# Patient Record
Sex: FEMALE | Race: WHITE | NOT HISPANIC OR LATINO | Employment: FULL TIME | URBAN - NONMETROPOLITAN AREA
[De-identification: names, ages, dates, MRNs, and addresses within clinical notes are randomized per-mention and may not be internally consistent; named-entity substitution may affect disease eponyms.]

---

## 2023-04-19 ENCOUNTER — OFFICE VISIT (OUTPATIENT)
Dept: PRIMARY CARE | Facility: CLINIC | Age: 53
End: 2023-04-19
Payer: COMMERCIAL

## 2023-04-19 VITALS
HEART RATE: 58 BPM | OXYGEN SATURATION: 96 % | RESPIRATION RATE: 12 BRPM | DIASTOLIC BLOOD PRESSURE: 81 MMHG | HEIGHT: 66 IN | TEMPERATURE: 97.8 F | SYSTOLIC BLOOD PRESSURE: 121 MMHG | BODY MASS INDEX: 25.65 KG/M2 | WEIGHT: 159.6 LBS

## 2023-04-19 DIAGNOSIS — R61 HYPERHIDROSIS: ICD-10-CM

## 2023-04-19 DIAGNOSIS — E78.2 MIXED HYPERLIPIDEMIA: ICD-10-CM

## 2023-04-19 DIAGNOSIS — E03.9 ACQUIRED HYPOTHYROIDISM: Primary | ICD-10-CM

## 2023-04-19 DIAGNOSIS — E55.9 VITAMIN D DEFICIENCY: ICD-10-CM

## 2023-04-19 DIAGNOSIS — B00.1 RECURRENT COLD SORES: ICD-10-CM

## 2023-04-19 PROCEDURE — 99214 OFFICE O/P EST MOD 30 MIN: CPT | Performed by: FAMILY MEDICINE

## 2023-04-19 PROCEDURE — 1036F TOBACCO NON-USER: CPT | Performed by: FAMILY MEDICINE

## 2023-04-19 RX ORDER — GLUCOSAM/CHONDRO/HERB 149/HYAL 750-100 MG
TABLET ORAL
COMMUNITY

## 2023-04-19 RX ORDER — CHOLECALCIFEROL (VITAMIN D3) 50 MCG
TABLET ORAL
COMMUNITY

## 2023-04-19 RX ORDER — ALUMINUM CHLORIDE 20 %
SOLUTION, NON-ORAL TOPICAL NIGHTLY
Qty: 60 ML | Refills: 2 | Status: SHIPPED | OUTPATIENT
Start: 2023-04-19 | End: 2024-04-18

## 2023-04-19 RX ORDER — ACYCLOVIR 50 MG/G
1 OINTMENT TOPICAL
Qty: 15 G | Refills: 1 | Status: SHIPPED | OUTPATIENT
Start: 2023-04-19

## 2023-04-19 RX ORDER — FERROUS SULFATE 325(65) MG
65 TABLET ORAL DAILY
COMMUNITY

## 2023-04-19 RX ORDER — LEVOTHYROXINE SODIUM 75 UG/1
TABLET ORAL
COMMUNITY
End: 2024-06-04 | Stop reason: SDUPTHER

## 2023-04-19 RX ORDER — MULTIVIT-MIN/IRON/FOLIC ACID/K 18-600-40
CAPSULE ORAL
COMMUNITY

## 2023-04-19 NOTE — PROGRESS NOTES
"Subjective     Patient ID: Helen Gómez is a 52 y.o. female who presents for follow up of chronic medical conditions.    Tdap- 03/30/2016  Mammogram- 10/20/2022  Colonoscopy- 04/02/2019  Pap: 12/07/2022    Doing good, no concerns at this time   Needs a script  for the cold sores and the medication for the sweating- would like paper scripts.     HPI  Patient states that seasonal allergies have been well controlled with OTC allergy medication.    Patient requests prescription cream for cold sores.    Patient notes increased sweating recently.     Review of Systems  constitutional: as per HPI  eyes:as per HPI  CV:as per HPI  Respiratory:as per HPI  GI:as per HPI  neuro:as per HPI  endo:as per HPI  heme/lymph:as per HPI     Objective   /81 (BP Location: Left arm, Patient Position: Sitting, BP Cuff Size: Adult)   Pulse 58   Temp 36.6 °C (97.8 °F) (Temporal)   Resp 12   Ht 1.664 m (5' 5.5\")   Wt 72.4 kg (159 lb 9.6 oz)   LMP  (LMP Unknown)   SpO2 96%   BMI 26.15 kg/m²   Physical Exam  Constitutional:       Appearance: Normal appearance. She is overweight.   Cardiovascular:      Rate and Rhythm: Normal rate and regular rhythm.   Pulmonary:      Effort: Pulmonary effort is normal.      Breath sounds: Normal breath sounds.         Assessment/Plan   Problem List Items Addressed This Visit          Endocrine/Metabolic    Acquired hypothyroidism - Primary     Stable.  Continue Synthroid 75 mcg daily.  TSH has been stable, so plan is to wait until October for blood labs to recheck yearly.         Vitamin D deficiency     Stable.  Continue vitamin D supplement daily.            Infectious/Inflammatory    Recurrent cold sores     Sent prescription for antiviral cream to use as needed.         Relevant Medications    acyclovir (Zovirax) 5 % ointment       Other    Mixed hyperlipidemia     Stable.  Continue fish oil.         Hyperhidrosis     Sent prescription for Drysol.         Relevant Medications    aluminum " chloride (Drysol) 20 % external solution     Follow up in 6 months.     By signing my name below I, mati Shipman, attest that this documentation has been prepared under the direction and in the presence of Dr. Vick Henry. 04/19/23

## 2023-04-19 NOTE — ASSESSMENT & PLAN NOTE
Stable.  Continue Synthroid 75 mcg daily.  TSH has been stable, so plan is to wait until October for blood labs to recheck yearly.

## 2023-09-02 ENCOUNTER — TELEPHONE (OUTPATIENT)
Dept: PRIMARY CARE | Facility: CLINIC | Age: 53
End: 2023-09-02
Payer: COMMERCIAL

## 2023-09-02 DIAGNOSIS — J01.90 ACUTE NON-RECURRENT SINUSITIS, UNSPECIFIED LOCATION: Primary | ICD-10-CM

## 2023-09-02 RX ORDER — AMOXICILLIN AND CLAVULANATE POTASSIUM 875; 125 MG/1; MG/1
875 TABLET, FILM COATED ORAL 2 TIMES DAILY
Qty: 20 TABLET | Refills: 0 | Status: SHIPPED | OUTPATIENT
Start: 2023-09-02 | End: 2023-09-18 | Stop reason: ALTCHOICE

## 2023-09-18 ENCOUNTER — OFFICE VISIT (OUTPATIENT)
Dept: PRIMARY CARE | Facility: CLINIC | Age: 53
End: 2023-09-18
Payer: COMMERCIAL

## 2023-09-18 VITALS
TEMPERATURE: 97.4 F | SYSTOLIC BLOOD PRESSURE: 133 MMHG | RESPIRATION RATE: 16 BRPM | DIASTOLIC BLOOD PRESSURE: 74 MMHG | OXYGEN SATURATION: 94 % | HEART RATE: 59 BPM

## 2023-09-18 DIAGNOSIS — J06.9 VIRAL URI WITH COUGH: Primary | ICD-10-CM

## 2023-09-18 PROBLEM — E03.9 HYPOTHYROIDISM: Status: ACTIVE | Noted: 2023-09-18

## 2023-09-18 PROBLEM — D22.70 MELANOCYTIC NEVI OF UNSPECIFIED LOWER LIMB, INCLUDING HIP: Status: ACTIVE | Noted: 2018-06-20

## 2023-09-18 PROBLEM — J30.9 ALLERGIC RHINITIS: Status: ACTIVE | Noted: 2023-09-18

## 2023-09-18 PROBLEM — S01.501D UNSPECIFIED OPEN WOUND OF LIP, SUBSEQUENT ENCOUNTER: Status: ACTIVE | Noted: 2018-06-20

## 2023-09-18 PROBLEM — E03.2 HYPOTHYROIDISM DUE TO DRUGS: Status: ACTIVE | Noted: 2023-09-18

## 2023-09-18 PROBLEM — R92.30 DENSE BREASTS: Status: ACTIVE | Noted: 2023-09-18

## 2023-09-18 PROBLEM — G47.30 MILD SLEEP APNEA: Status: ACTIVE | Noted: 2023-09-18

## 2023-09-18 PROBLEM — Z85.828 PERSONAL HISTORY OF OTHER MALIGNANT NEOPLASM OF SKIN: Status: ACTIVE | Noted: 2018-06-20

## 2023-09-18 PROBLEM — D22.39 MELANOCYTIC NEVI OF OTHER PARTS OF FACE: Status: ACTIVE | Noted: 2018-06-20

## 2023-09-18 PROBLEM — L57.0 ACTINIC KERATOSIS: Status: ACTIVE | Noted: 2018-06-20

## 2023-09-18 PROBLEM — D22.5 MELANOCYTIC NEVI OF TRUNK: Status: ACTIVE | Noted: 2018-06-20

## 2023-09-18 PROBLEM — L57.9 SKIN CHANGES DUE TO CHRONIC EXPOSURE TO NONIONIZING RADIATION, UNSPECIFIED: Status: ACTIVE | Noted: 2018-06-20

## 2023-09-18 PROBLEM — C44.311 BASAL CELL CARCINOMA OF SKIN OF NOSE: Status: ACTIVE | Noted: 2018-06-20

## 2023-09-18 PROBLEM — L90.5 SCAR CONDITION AND FIBROSIS OF SKIN: Status: ACTIVE | Noted: 2018-06-20

## 2023-09-18 PROBLEM — N95.1 MENOPAUSAL SYMPTOMS: Status: ACTIVE | Noted: 2023-09-18

## 2023-09-18 PROBLEM — R06.83 SNORING: Status: ACTIVE | Noted: 2023-09-18

## 2023-09-18 PROBLEM — E78.5 HYPERLIPIDEMIA: Status: ACTIVE | Noted: 2023-09-18

## 2023-09-18 PROBLEM — L82.1 OTHER SEBORRHEIC KERATOSIS: Status: ACTIVE | Noted: 2018-06-20

## 2023-09-18 PROBLEM — R92.2 DENSE BREASTS: Status: ACTIVE | Noted: 2023-09-18

## 2023-09-18 PROBLEM — D22.60 MELANOCYTIC NEVI OF UNSPECIFIED UPPER LIMB, INCLUDING SHOULDER: Status: ACTIVE | Noted: 2018-06-20

## 2023-09-18 PROBLEM — D48.5 NEOPLASM OF UNCERTAIN BEHAVIOR OF SKIN: Status: ACTIVE | Noted: 2018-06-20

## 2023-09-18 PROBLEM — I78.1 NEVUS, NON-NEOPLASTIC: Status: ACTIVE | Noted: 2018-06-20

## 2023-09-18 PROCEDURE — 99213 OFFICE O/P EST LOW 20 MIN: CPT | Performed by: FAMILY MEDICINE

## 2023-09-18 PROCEDURE — 1036F TOBACCO NON-USER: CPT | Performed by: FAMILY MEDICINE

## 2023-09-18 PROCEDURE — 87635 SARS-COV-2 COVID-19 AMP PRB: CPT

## 2023-09-18 NOTE — PROGRESS NOTES
Subjective   Patient ID: Helen Gómez is a 52 y.o. female who presents for nasal drainage, Cough, and Sore Throat.    HPI   duration: 3 days   facial pain/pressure: Y  facial swelling:N  dental pain:N  nasal discharge:Y  congestion:Y  ear pain: N  fever: N  body aches:Y  chills:N  sore throat:Y  swollen glands: N  cough:Y  General fatigue: Y  Headache: N  Nasal itching: N  Nose bleeds:N  Sneezing:Y  Wheezing: N  Hives: N    treatment: ROBATUSSIN     Finished augmentin antibiotic on 9/12 for a sinus infection, prescribed by .   She returned from traveling a few days ago, [place]. She was on a cruise and flew.  Took a covid test initially and it was negative.     Review of Systems  See HPI    Objective   /74 (BP Location: Left arm, Patient Position: Sitting, BP Cuff Size: Adult)   Pulse 59   Temp 36.3 °C (97.4 °F) (Temporal)   Resp 16   LMP  (LMP Unknown)   SpO2 94%     Physical Exam  Constitutional: Well developed, well nourished, alert and in no acute distress.  Head and Face: NC/AT  Eyes: Normal external exam.   ENT: External inspection of ears normal, tympanic membranes visualized and normal. Nasal mucosa and turbinates swollen and erythematous, clear nasal discharge present. Oral mucosa moist, oropharynx clear without tonsillar exudate or erythema.   Neck: Supple. No cervical lymphadenopathy   Cardiovascular: Regular rate and rhythm, normal S1 and S2, no murmurs, gallops, or rubs.   Pulmonary: No respiratory distress, lungs clear to auscultation bilaterally. No wheezes, rhonchi, rales.  Skin: Warm, well perfused, normal skin turgor and color.   Neurologic: Cranial nerves II-XII grossly intact.      Assessment/Plan     Pending covid-19 test    You have been diagnosed with an upper respiratory tract infection (URI), which is an inflammation/infection of the upper respiratory tract /lungs.  These are almost viral in nature, and therefore treatment is management of symptoms. Treatment with an  antibiotic for typical URI does not help, and can cause harm from side effects of medications and bacterial resistance.     Drink at least 6-8 glasses of water daily to thin secretions.  Mucinex can be used if secretions remain thick.      A teaspoon of honey every 4 hours as needed for cough has been shown to reduce cough as well or better than over-the-counter cough suppressants.  Delsym or Robitussin are recommended to stop cough.  Cough drops can also be helpful.     For nasal congestion, please use Richard Med Sinus Rinse at least once daily to rinse out your sinuses. You can also try Flonase nasal spray over the counter - 1-2 sprays in each nostril daily. You can also consider Sudafed or Phenylephrine for nasal congestion but avoid if have hypertension and be aware can stimulate and cause problems sleeping.  Do not use for more than 5 days. Afrin decongestant nasal spray (oxymetazoline) can also be used for 2-3 days only.     For drainage problems, try Allegra, Claritin or Zyrtec.      For sore throat, try honey in tea, Chloraseptic, Cepacol throat lozenges, and salt water gargles.      Fever or aches can be helped by taking acetaminophen (Tylenol) every four hours as needed, or ibuprofen (Motrin, Advil) or naproxen (Aleve) as directed if you are able.   Maximum dosing of ibuprofen is 800 mg every 8 hours and maximum dose of tylenol is 1,000 mg every 8 hours - do not use for longer than 1 week unless directed by your doctor.       If you should develop a fever and worsening cough or nasal secretions with consistent yellow or green phlegm, please contact us.

## 2023-09-19 LAB — SARS-COV-2 RESULT: NOT DETECTED

## 2023-09-20 ENCOUNTER — TELEPHONE (OUTPATIENT)
Dept: PRIMARY CARE | Facility: CLINIC | Age: 53
End: 2023-09-20
Payer: COMMERCIAL

## 2023-09-20 NOTE — TELEPHONE ENCOUNTER
Patient was seen Monday for URI    She is still coughing up yellow phlegm, body aches, constant runny nose and is not improving at all    Is there something that can be called in/recommended for her to take?    She uses [email or web address] in [place]

## 2023-09-20 NOTE — TELEPHONE ENCOUNTER
Patient is now on day 6-7 of illness, this is likely still viral and should be improving in the next 1-2 days as viruses typically peak around this time which is why she has not improved yet.  She should continue supportive care at home at this time and drink plenty of fluids. If she develops fever and/or shortness of breath we can consider a CXR.

## 2023-10-10 ENCOUNTER — OFFICE VISIT (OUTPATIENT)
Dept: PRIMARY CARE | Facility: CLINIC | Age: 53
End: 2023-10-10
Payer: COMMERCIAL

## 2023-10-10 VITALS
DIASTOLIC BLOOD PRESSURE: 72 MMHG | BODY MASS INDEX: 25.63 KG/M2 | OXYGEN SATURATION: 95 % | HEART RATE: 60 BPM | SYSTOLIC BLOOD PRESSURE: 133 MMHG | RESPIRATION RATE: 16 BRPM | TEMPERATURE: 97.8 F | WEIGHT: 156.4 LBS

## 2023-10-10 DIAGNOSIS — H69.90 DYSFUNCTION OF EUSTACHIAN TUBE, UNSPECIFIED LATERALITY: ICD-10-CM

## 2023-10-10 DIAGNOSIS — R06.2 WHEEZING: ICD-10-CM

## 2023-10-10 DIAGNOSIS — J30.9 ALLERGIC RHINITIS, UNSPECIFIED SEASONALITY, UNSPECIFIED TRIGGER: Primary | ICD-10-CM

## 2023-10-10 DIAGNOSIS — D48.5 NEOPLASM OF UNCERTAIN BEHAVIOR OF SCALP: ICD-10-CM

## 2023-10-10 PROCEDURE — 99213 OFFICE O/P EST LOW 20 MIN: CPT | Performed by: FAMILY MEDICINE

## 2023-10-10 PROCEDURE — 1036F TOBACCO NON-USER: CPT | Performed by: FAMILY MEDICINE

## 2023-10-10 RX ORDER — PREDNISONE 10 MG/1
TABLET ORAL
Qty: 21 TABLET | Refills: 0 | Status: SHIPPED | OUTPATIENT
Start: 2023-10-10 | End: 2023-12-07 | Stop reason: WASHOUT

## 2023-10-10 RX ORDER — ALBUTEROL SULFATE 90 UG/1
2 AEROSOL, METERED RESPIRATORY (INHALATION) EVERY 4 HOURS PRN
Qty: 8.5 G | Refills: 0 | Status: SHIPPED | OUTPATIENT
Start: 2023-10-10 | End: 2023-12-07 | Stop reason: WASHOUT

## 2023-10-10 NOTE — PATIENT INSTRUCTIONS
Assessment/Plan   Diagnoses and all orders for this visit:  Allergic rhinitis, unspecified seasonality, unspecified trigger  -     predniSONE (Deltasone) 10 mg tablet; 4 tabs x 2 d  3 tabs x 2 d  2 tabs x 2 d  1 tab x 2 d  1/2 tab x 2 days  Dysfunction of Eustachian tube, unspecified laterality  -     predniSONE (Deltasone) 10 mg tablet; 4 tabs x 2 d  3 tabs x 2 d  2 tabs x 2 d  1 tab x 2 d  1/2 tab x 2 days  Wheezing  -     predniSONE (Deltasone) 10 mg tablet; 4 tabs x 2 d  3 tabs x 2 d  2 tabs x 2 d  1 tab x 2 d  1/2 tab x 2 days  -     albuterol (ProAir HFA) 90 mcg/actuation inhaler; Inhale 2 puffs every 4 hours if needed for wheezing or shortness of breath.  Neoplasm of uncertain behavior of scalp    #1 lesion to scalp - patient to reach out to her established dermatologist in [place] for further evaluation. Can look for more local dermatologist if she wishes. Can all or message for dermatology referral if needed.    #2 cough/ETD/allergies    Exam and symptoms consistent with post-infectious bronchospasm and inflammatory process. Given duration and bothersome symptoms would start on oral steroid.    Prednisone taper has been sent to your pharmacy.  Side effects can include insomnia, irritability, hunger, weight gain, flushing,  elevated blood sugars, suppressed immune system.     Albuterol inhaler has been sent to your pharmacy.  You may use one to two puffs every four hours as needed for wheeze, cough, or chest tightness, or for use 15 minutes prior to activity if exercise is triggering your symptoms. Ask your pharmacist for help with instructions for administration if you are not familiar with use of inhalers.     Continue with netipot once daily.  Continue with nasal spray.  Continue with oral antihistamine.    You may find it helpful to add in antiinflammatory foods to your diet such as ginger, turmeric, rosemary, and cinnamon.     Consider supplementing with turmeric capsules 500 mg - 1000 mg daily for  anti-inflammatory effects    Follow-up with Dr. Henry for routine care.  Call for sooner follow-up if needed.     Scribe Attestation  By signing my name below, I, Marcos Coronel   attest that this documentation has been prepared under the direction and in the presence of uKsum Ortiz DO.

## 2023-10-10 NOTE — PROGRESS NOTES
Subjective   Patient ID: Helen Gómez is a 52 y.o. female who presents for Sinus Problem (Pt presents with some sinus issues, residual dry cough, drainage- states that this has been going on for about 3 weeks ) and lump on head  (Pt presents with a lump on her head for a couple of weeks- states that it is about half the size that it was previously, no pain, no redness).    HPI       Patient of Vick Henry MD    Patient here for evaluation of lump on head and upper respiratory symptoms.    #1 Lump to scalp.  Reports noticed this couple of weeks, found by her .  She is unsure how long present but reports it has decreased to half the size it was previously.   No pain, no redness.  She is already established with dermatology in [place].    #2 Sinus issues  States started after coming back from cruise to [place].    Finished augmentin antibiotic on 9/12 for a sinus infection, prescribed by .     Saw Dr. Baldwin on 9/18/2023 and was advised to start oral antihistamine and netipot.    Reports residual dry cough and sinus drainage x 3-4 weeks.  Still having to clear throat but no colored sputum.  Nose is clear, no green or yellow sputum.  Taking Claritin at night.  Uses nasal spray daily.  Was taking Zyrtec in AM too but did not seem to help so she stopped.  Was using netipot twice daily but just recently stopped this.    States typically does not have allergy issues on regular basis.      Review of Systems   All other systems reviewed and are negative.    Objective   /72 (BP Location: Left arm, Patient Position: Sitting, BP Cuff Size: Small adult)   Pulse 60   Temp 36.6 °C (97.8 °F) (Temporal)   Resp 16   Wt 70.9 kg (156 lb 6.4 oz)   LMP  (LMP Unknown)   SpO2 95%   BMI 25.63 kg/m²     Physical Exam  Vitals and nursing note reviewed.   Constitutional:       General: She is not in acute distress.     Appearance: Normal appearance. She is not toxic-appearing.   HENT:      Head:  Normocephalic and atraumatic.      Right Ear: Tympanic membrane, ear canal and external ear normal.      Left Ear: Tympanic membrane, ear canal and external ear normal.      Nose:      Right Turbinates: Swollen (significant).      Left Turbinates: Swollen (significant).      Comments: Nasal turbinates edematous,    right greater than left/ possible polyp vs edematous inferior turbinate     Mouth/Throat:      Lips: Pink.      Mouth: Mucous membranes are moist.      Pharynx: Oropharynx is clear.   Eyes:      Extraocular Movements: Extraocular movements intact.      Pupils: Pupils are equal, round, and reactive to light.   Neck:      Thyroid: No thyromegaly.   Cardiovascular:      Rate and Rhythm: Normal rate and regular rhythm.      Heart sounds: No murmur heard.     No friction rub. No gallop.   Pulmonary:      Effort: Pulmonary effort is normal.      Breath sounds: Wheezing (with forced expiration) present. No rhonchi or rales.   Lymphadenopathy:      Cervical: No cervical adenopathy.   Skin:     Comments: Approximately 3 cm x 2 cm brown papule on right parietal scalp region, textured/poss verrucous.    Neurological:      Mental Status: She is alert and oriented to person, place, and time.   Psychiatric:         Mood and Affect: Mood normal.         Behavior: Behavior normal.         Assessment/Plan   Diagnoses and all orders for this visit:  Allergic rhinitis, unspecified seasonality, unspecified trigger  -     predniSONE (Deltasone) 10 mg tablet; 4 tabs x 2 d  3 tabs x 2 d  2 tabs x 2 d  1 tab x 2 d  1/2 tab x 2 days  Dysfunction of Eustachian tube, unspecified laterality  -     predniSONE (Deltasone) 10 mg tablet; 4 tabs x 2 d  3 tabs x 2 d  2 tabs x 2 d  1 tab x 2 d  1/2 tab x 2 days  Wheezing  -     predniSONE (Deltasone) 10 mg tablet; 4 tabs x 2 d  3 tabs x 2 d  2 tabs x 2 d  1 tab x 2 d  1/2 tab x 2 days  -     albuterol (ProAir HFA) 90 mcg/actuation inhaler; Inhale 2 puffs every 4 hours if needed for  wheezing or shortness of breath.  Neoplasm of uncertain behavior of scalp    #1 lesion to scalp - patient to reach out to her established dermatologist in [place] for further evaluation. Can look for more local dermatologist if she wishes. Can all or message for dermatology referral if needed.    #2 cough/ETD/allergies    Exam and symptoms consistent with post-infectious bronchospasm and inflammatory process. Given duration and bothersome symptoms would start on oral steroid.    Prednisone taper has been sent to your pharmacy.  Side effects can include insomnia, irritability, hunger, weight gain, flushing,  elevated blood sugars, suppressed immune system.     Albuterol inhaler has been sent to your pharmacy.  You may use one to two puffs every four hours as needed for wheeze, cough, or chest tightness, or for use 15 minutes prior to activity if exercise is triggering your symptoms. Ask your pharmacist for help with instructions for administration if you are not familiar with use of inhalers.     Continue with netipot once daily.  Continue with nasal spray.  Continue with oral antihistamine.    You may find it helpful to add in antiinflammatory foods to your diet such as ginger, turmeric, rosemary, and cinnamon.     NEXT STEP WOULD BE ENT FOR SINUS EVALUATION IF SYMPTOMS DO NOT IMPROVE OR IF THEY RECUR OFTEN    Follow-up with Dr. Henry for routine care.  Call for sooner follow-up if needed.     Scribe Attestation  By signing my name below, Leanne BRADSHAW Scribe   attest that this documentation has been prepared under the direction and in the presence of Kusum Ortiz DO.

## 2023-10-18 ENCOUNTER — APPOINTMENT (OUTPATIENT)
Dept: PRIMARY CARE | Facility: CLINIC | Age: 53
End: 2023-10-18
Payer: COMMERCIAL

## 2023-12-06 ENCOUNTER — APPOINTMENT (OUTPATIENT)
Dept: PRIMARY CARE | Facility: CLINIC | Age: 53
End: 2023-12-06
Payer: COMMERCIAL

## 2023-12-06 PROBLEM — Z00.00 HEALTHCARE MAINTENANCE: Status: ACTIVE | Noted: 2023-12-06

## 2023-12-06 NOTE — PROGRESS NOTES
Subjective   Patient ID: Helen Gómez is a 53 y.o. female who presents for Annual Exam (Pt presents for annual physical exam- Abn given to pt and signed, pt verbalized understanding. Pt states no other concerns at this time.).    HPI     Patient of Vick Henry MD    Patient presents today for annual physical.  Patient is doing well overall, they have no new concerns or issues.     States believes stressed, dog is currently at vet with .  Elevated BP today is outlier for patient.  She does have BP cuff at home but does not usually check.  Patient denies chest pain, shortness of breath with exertion, palpitations, lower extremity edema, headache, or dizziness.     She is taking vitamin D supplement, 5000 IU daily.  Drinks dairy in lattes, unsure if taking any calcium supplement.    WELLNESS VISIT   TDAP: none found  SHINGRIX: completed  PNEUMOVAX: N/A  PAP: 12/2022 (ASCUS negative, HPV negative) (repeat due 2027)  MAMMO: 10/2022  CSCOPE: 4/2019 (no polyps, diverticulosis, repeat in 10 years)  DEXA: N/A  HEP C SCREEN: none found  CACS: 0 in 10/2018 (age 48) (father with hx of CAD s/p CABG x 4)  LIPID: 10/2022 TC/HDL 2.8, HDL 81, , 74    Patient has already had her influenza vaccine.  Patient believes her TDAP vaccine is UTD.    Diet: balanced, eats a fairly healthy diet  Exercise: walks dogs frequently, horse back riding on weekends, no weight training  Alcohol use: rare, maybe 1 beverage per week  Smoking: never smoker    Cervical cancer screening: UTD  Denies family history in first degree relative.  Denies pelvic pain, vaginal discharge, or vaginal bleeding.    Breast cancer screening: DUE  Denies family history in first degree relative.  Denies lumps/bumps, skin changes, nipple retraction, or nipple drainage.    Colon cancer screening: UTD  Denies family history in first degree relative.  Denies melena, hematochezia, constipation, diarrhea, bloating, change in bowel habits.    ROUTINE  "VISIT  CHRONIC CONDITIONS:    -HYPOTHYROIDISM  Last TSH in 10/2022 was normal  Current dose: Synthroid 75 mcg x 5 days, 150 mcg x 2 days    Medication is being taken as directed and is well-tolerated.   Patient denies heat or cold intolerance, diarrhea or constipation, coarsening of hair, and palpitations.     -SLEEP APNEA  Mild per 3/2021 sleep study  Reports snoring but feels well-rested upon waking, no napping needed.  Some headaches but feels is weather related    Review of Systems   All other systems reviewed and are negative.      Objective   /83 (BP Location: Right arm, Patient Position: Sitting, BP Cuff Size: Small adult)   Pulse 54   Temp 36.6 °C (97.8 °F) (Temporal)   Resp 14   Ht 1.651 m (5' 5\")   Wt 69.5 kg (153 lb 4.8 oz)   LMP  (LMP Unknown)   SpO2 96%   BMI 25.51 kg/m²     Physical Exam  Vitals and nursing note reviewed.   Constitutional:       General: She is not in acute distress.     Appearance: Normal appearance. She is not toxic-appearing.   HENT:      Head: Normocephalic and atraumatic.   Eyes:      Extraocular Movements: Extraocular movements intact.      Pupils: Pupils are equal, round, and reactive to light.   Neck:      Thyroid: No thyromegaly.      Vascular: No hepatojugular reflux or JVD.   Cardiovascular:      Rate and Rhythm: Normal rate and regular rhythm.      Heart sounds: No murmur heard.     No friction rub. No gallop.   Pulmonary:      Effort: Pulmonary effort is normal.      Breath sounds: Normal breath sounds. No wheezing, rhonchi or rales.   Abdominal:      General: Bowel sounds are normal. There is no distension.      Palpations: Abdomen is soft. There is no mass.      Tenderness: There is no abdominal tenderness. There is no guarding.   Musculoskeletal:      Right lower leg: No edema.      Left lower leg: No edema.   Lymphadenopathy:      Cervical: No cervical adenopathy.   Neurological:      General: No focal deficit present.      Mental Status: She is alert and " oriented to person, place, and time.   Psychiatric:         Mood and Affect: Mood normal.         Behavior: Behavior normal.         Assessment/Plan   Problem List Items Addressed This Visit             ICD-10-CM    Acquired hypothyroidism E03.9     S/p RIOS to treat toxic nodule   Last TSH was normal in 10/2022, due for recheck, ordered today.  Continue present regimen at this time, will be notified if changes needed pending lab results.         Relevant Orders    TSH with reflex to Free T4 if abnormal    Mild sleep apnea G47.30     Very mild noted on sleep study from 2021  Some snoring (not disruptive) but feels well-rested.  Not using CPAP, plans to continue with conservative treatments.    Uncontrolled sleep apnea can lead to fatigue, daytime somnolence, weight gain, hypertension, and fluid retention. If becomes bothersome can reach out for next steps.         Healthcare maintenance - Primary Z00.00     TDAP: none found  SHINGRIX: completed  PNEUMOVAX: N/A  PAP: 12/2022 (ASCUS negative, HPV negative) (repeat due 2027)  MAMMO: 10/2022  CSCOPE: 4/2019 (no polyps, diverticulosis, repeat in 10 years)  DEXA: N/A  HEP C SCREEN: none found  CACS: 0 in 10/2018 (age 48)  LIPID: 10/2022 TC/HDL 2.8, HDL 81, , 74    Vaccines and screenings reviewed.  Questionnaires completed.  Health and wellness topics reviewed.  Diet and exercise recommendations revisited.  Routine blood work ordered today.     VACCINES:  -Flu is UTD for Fall 2023  -TDAP is UTD per patient report  -Shingrix vaccines previously completed, good for lifetime.  -Pneumonia vaccine recommended at age 65+.    SCREENINGS:  -Screening pap is UTD, normal in 2022, repeat due in 2027  -Screening mammogram is DUE, ordered today  -Screening colonoscopy is UTD, no polyps in 2019, repeat in 5-10 years.  -Screening DEXA recommended for ages 65+    LIFESTYLE MEASURES  -make sure you are avoiding refined carbs such as breads, pasta, cereal, candy, soda,  nutrition  bars, granola, chips, and sugar sweetened beverages.      -eat 5- 7 servings daily of veggies,  healthy protein such as chicken, fish,  beans, and eggs, and include healthy fats in your diet such as seeds, nuts, olive oil, avocados, and salmon.   -exercise 4 - 6 days per week as you are able, 150 minutes total weekly divided up is recommended.  -Vitamin D is recommended at 1000 - 5000 IU international units daily.   -Always wear sunscreen when you have sun exposure.  -64 oz of water is recommended daily.  -Dental visits recommended every 6 months.  -Eye exam recommended every 2 years, for those with vision problems every year.           Relevant Orders    CBC    Comprehensive Metabolic Panel    Lipid Panel    Elevated BP without diagnosis of hypertension R03.0     BP is 147/83 in office today, this is elevated but outlier for patient.  Patient reports some circumstantial stressors she feels are contributing to this.  Goal BP is 130/80 or less, ideally 120/80 or less.   I have asked that patient monitor BP at home and keep log.  If running above 130/80 can try supplements/recs below.  If continues to be elevated with those measures patient to reach out for next steps.    Patient is encouraged to continue low sodium diet (Dietary Approach to Stop Hypertension, or DASH diet) .   Exercise most days of the week.   Limit refined carbohydrates such as pretzels, cereals, breads, pastries, alcohol.    If patient wishes to try a natural approach to lowering blood pressure, can consider:  -whey protein 20 -30 g daily (hydrolyzed is best, but any is ok)  -aged garlic 600 mg twice daily ([employer] brand aged garlic on line is one example)   -CoQ10 supplement at 120 mg - 360 mg daily (average dose studied 225mg daily).     Patient to check BP regularly at home and keep a diary.  This should be taken after sitting with feet flat on floor and resting for 5 minutes.   Arm should be level with your heart.   New guidelines recommend goal  for blood pressure less than 130/80.   Ideally for stroke and heart attack risk reduction the systolic, or top, blood pressure number should be in the 110's or 120's.    PLEASE BRING BP CUFF IN TO NEXT VISIT FOR VALIDATION - TAKE YOUR BP @ HOME BEFORE YOU COME!           Other Visit Diagnoses         Codes    Encounter for screening mammogram for malignant neoplasm of breast     Z12.31    Relevant Orders    BI mammo bilateral screening tomosynthesis            Follow-up with Dr. Henry in 1 year for routine care + CPE.  Call for sooner follow-up if needed.     Scribe Attestation  By signing my name below, ILeanne Scribe   attest that this documentation has been prepared under the direction and in the presence of Kusum Ortiz DO.

## 2023-12-07 ENCOUNTER — APPOINTMENT (OUTPATIENT)
Dept: LAB | Facility: LAB | Age: 53
End: 2023-12-07
Payer: COMMERCIAL

## 2023-12-07 ENCOUNTER — ANCILLARY PROCEDURE (OUTPATIENT)
Dept: RADIOLOGY | Facility: CLINIC | Age: 53
End: 2023-12-07
Payer: COMMERCIAL

## 2023-12-07 ENCOUNTER — OFFICE VISIT (OUTPATIENT)
Dept: PRIMARY CARE | Facility: CLINIC | Age: 53
End: 2023-12-07
Payer: COMMERCIAL

## 2023-12-07 VITALS
BODY MASS INDEX: 25.54 KG/M2 | SYSTOLIC BLOOD PRESSURE: 147 MMHG | OXYGEN SATURATION: 96 % | HEART RATE: 54 BPM | DIASTOLIC BLOOD PRESSURE: 83 MMHG | TEMPERATURE: 97.8 F | HEIGHT: 65 IN | WEIGHT: 153.3 LBS | RESPIRATION RATE: 14 BRPM

## 2023-12-07 DIAGNOSIS — E03.9 ACQUIRED HYPOTHYROIDISM: ICD-10-CM

## 2023-12-07 DIAGNOSIS — Z12.31 ENCOUNTER FOR SCREENING MAMMOGRAM FOR MALIGNANT NEOPLASM OF BREAST: ICD-10-CM

## 2023-12-07 DIAGNOSIS — Z00.00 HEALTHCARE MAINTENANCE: ICD-10-CM

## 2023-12-07 DIAGNOSIS — Z00.00 HEALTHCARE MAINTENANCE: Primary | ICD-10-CM

## 2023-12-07 DIAGNOSIS — R03.0 ELEVATED BP WITHOUT DIAGNOSIS OF HYPERTENSION: ICD-10-CM

## 2023-12-07 DIAGNOSIS — G47.30 MILD SLEEP APNEA: ICD-10-CM

## 2023-12-07 PROCEDURE — 77067 SCR MAMMO BI INCL CAD: CPT | Performed by: RADIOLOGY

## 2023-12-07 PROCEDURE — 77067 SCR MAMMO BI INCL CAD: CPT

## 2023-12-07 PROCEDURE — 99396 PREV VISIT EST AGE 40-64: CPT | Performed by: FAMILY MEDICINE

## 2023-12-07 PROCEDURE — 84443 ASSAY THYROID STIM HORMONE: CPT

## 2023-12-07 PROCEDURE — 80053 COMPREHEN METABOLIC PANEL: CPT

## 2023-12-07 PROCEDURE — 1036F TOBACCO NON-USER: CPT | Performed by: FAMILY MEDICINE

## 2023-12-07 PROCEDURE — 85027 COMPLETE CBC AUTOMATED: CPT

## 2023-12-07 PROCEDURE — 80061 LIPID PANEL: CPT

## 2023-12-07 PROCEDURE — 77063 BREAST TOMOSYNTHESIS BI: CPT | Performed by: RADIOLOGY

## 2023-12-07 PROCEDURE — 36415 COLL VENOUS BLD VENIPUNCTURE: CPT

## 2023-12-07 NOTE — ASSESSMENT & PLAN NOTE
S/p RIOS to treat toxic nodule   Last TSH was normal in 10/2022, due for recheck, ordered today.  Continue present regimen at this time, will be notified if changes needed pending lab results.

## 2023-12-07 NOTE — ASSESSMENT & PLAN NOTE
Very mild noted on sleep study from 2021  Some snoring (not disruptive) but feels well-rested.  Not using CPAP, plans to continue with conservative treatments.    Uncontrolled sleep apnea can lead to fatigue, daytime somnolence, weight gain, hypertension, and fluid retention. If becomes bothersome can reach out for next steps.

## 2023-12-07 NOTE — ASSESSMENT & PLAN NOTE
TDAP: none found  SHINGRIX: completed  PNEUMOVAX: N/A  PAP: 12/2022 (ASCUS negative, HPV negative) (repeat due 2027)  MAMMO: 10/2022  CSCOPE: 4/2019 (no polyps, diverticulosis, repeat in 10 years)  DEXA: N/A  HEP C SCREEN: none found  CACS: 0 in 10/2018 (age 48)  LIPID: 10/2022 TC/HDL 2.8, HDL 81, , 74    Vaccines and screenings reviewed.  Questionnaires completed.  Health and wellness topics reviewed.  Diet and exercise recommendations revisited.  Routine blood work ordered today.     VACCINES:  -Flu is UTD for Fall 2023  -TDAP is UTD per patient report  -Shingrix vaccines previously completed, good for lifetime.  -Pneumonia vaccine recommended at age 65+.    SCREENINGS:  -Screening pap is UTD, normal in 2022, repeat due in 2027  -Screening mammogram is DUE, ordered today  -Screening colonoscopy is UTD, no polyps in 2019, repeat in 5-10 years.  -Screening DEXA recommended for ages 65+    LIFESTYLE MEASURES  -make sure you are avoiding refined carbs such as breads, pasta, cereal, candy, soda,  nutrition bars, granola, chips, and sugar sweetened beverages.      -eat 5- 7 servings daily of veggies,  healthy protein such as chicken, fish,  beans, and eggs, and include healthy fats in your diet such as seeds, nuts, olive oil, avocados, and salmon.   -exercise 4 - 6 days per week as you are able, 150 minutes total weekly divided up is recommended.  -Vitamin D is recommended at 1000 - 5000 IU international units daily.   -Always wear sunscreen when you have sun exposure.  -64 oz of water is recommended daily.  -Dental visits recommended every 6 months.  -Eye exam recommended every 2 years, for those with vision problems every year.

## 2023-12-08 LAB
ALBUMIN SERPL BCP-MCNC: 4.5 G/DL (ref 3.4–5)
ALP SERPL-CCNC: 92 U/L (ref 33–110)
ALT SERPL W P-5'-P-CCNC: 20 U/L (ref 7–45)
ANION GAP SERPL CALC-SCNC: 12 MMOL/L (ref 10–20)
AST SERPL W P-5'-P-CCNC: 20 U/L (ref 9–39)
BILIRUB SERPL-MCNC: 0.9 MG/DL (ref 0–1.2)
BUN SERPL-MCNC: 20 MG/DL (ref 6–23)
CALCIUM SERPL-MCNC: 10 MG/DL (ref 8.6–10.6)
CHLORIDE SERPL-SCNC: 104 MMOL/L (ref 98–107)
CHOLEST SERPL-MCNC: 244 MG/DL (ref 0–199)
CHOLESTEROL/HDL RATIO: 3
CO2 SERPL-SCNC: 31 MMOL/L (ref 21–32)
CREAT SERPL-MCNC: 0.8 MG/DL (ref 0.5–1.05)
ERYTHROCYTE [DISTWIDTH] IN BLOOD BY AUTOMATED COUNT: 13 % (ref 11.5–14.5)
GFR SERPL CREATININE-BSD FRML MDRD: 87 ML/MIN/1.73M*2
GLUCOSE SERPL-MCNC: 90 MG/DL (ref 74–99)
HCT VFR BLD AUTO: 43.3 % (ref 36–46)
HDLC SERPL-MCNC: 80.4 MG/DL
HGB BLD-MCNC: 14 G/DL (ref 12–16)
LDLC SERPL CALC-MCNC: 147 MG/DL
MCH RBC QN AUTO: 31 PG (ref 26–34)
MCHC RBC AUTO-ENTMCNC: 32.3 G/DL (ref 32–36)
MCV RBC AUTO: 96 FL (ref 80–100)
NON HDL CHOLESTEROL: 164 MG/DL (ref 0–149)
NRBC BLD-RTO: 0 /100 WBCS (ref 0–0)
PLATELET # BLD AUTO: 236 X10*3/UL (ref 150–450)
POTASSIUM SERPL-SCNC: 4 MMOL/L (ref 3.5–5.3)
PROT SERPL-MCNC: 7.1 G/DL (ref 6.4–8.2)
RBC # BLD AUTO: 4.52 X10*6/UL (ref 4–5.2)
SODIUM SERPL-SCNC: 143 MMOL/L (ref 136–145)
TRIGL SERPL-MCNC: 82 MG/DL (ref 0–149)
TSH SERPL-ACNC: 1.41 MIU/L (ref 0.44–3.98)
VLDL: 16 MG/DL (ref 0–40)
WBC # BLD AUTO: 5.6 X10*3/UL (ref 4.4–11.3)

## 2023-12-11 ENCOUNTER — OFFICE VISIT (OUTPATIENT)
Dept: DERMATOLOGY | Facility: CLINIC | Age: 53
End: 2023-12-11
Payer: COMMERCIAL

## 2023-12-11 DIAGNOSIS — D18.01 HEMANGIOMA OF SKIN: ICD-10-CM

## 2023-12-11 DIAGNOSIS — L81.4 LENTIGO: ICD-10-CM

## 2023-12-11 DIAGNOSIS — D22.9 MULTIPLE BENIGN NEVI: ICD-10-CM

## 2023-12-11 DIAGNOSIS — L82.1 SEBORRHEIC KERATOSIS: ICD-10-CM

## 2023-12-11 DIAGNOSIS — Z85.828 PERSONAL HISTORY OF SKIN CANCER: ICD-10-CM

## 2023-12-11 DIAGNOSIS — L57.0 ACTINIC KERATOSIS: Primary | ICD-10-CM

## 2023-12-11 PROCEDURE — 1036F TOBACCO NON-USER: CPT | Performed by: DERMATOLOGY

## 2023-12-11 PROCEDURE — 99203 OFFICE O/P NEW LOW 30 MIN: CPT | Performed by: DERMATOLOGY

## 2023-12-11 PROCEDURE — 17003 DESTRUCT PREMALG LES 2-14: CPT | Performed by: DERMATOLOGY

## 2023-12-11 PROCEDURE — 17000 DESTRUCT PREMALG LESION: CPT | Performed by: DERMATOLOGY

## 2023-12-11 NOTE — PROGRESS NOTES
Curt Gómez is a 53 y.o. female who presents for the following: Skin Check (Personal history of Actinic Keratosis. Personal history of basal cell carcinoma. Pt reports family history non melanoma skin cancer. ).     Review of Systems:  No other skin or systemic complaints other than what is documented elsewhere in the note.    The following portions of the chart were reviewed this encounter and updated as appropriate:  Tobacco  Allergies  Meds  Problems  Med Hx  Surg Hx  Fam Hx         Skin Cancer History  No skin cancer on file.      Specialty Problems          Dermatology Problems    Actinic keratosis    Melanocytic nevi of other parts of face    Melanocytic nevi of trunk    Melanocytic nevi of unspecified lower limb, including hip    Melanocytic nevi of unspecified upper limb, including shoulder    Neoplasm of uncertain behavior of skin    Nevus, non-neoplastic    Other seborrheic keratosis    Personal history of other malignant neoplasm of skin    Scar condition and fibrosis of skin    Skin changes due to chronic exposure to nonionizing radiation, unspecified        Objective     Well appearing patient in no apparent distress; mood and affect are within normal limits.    A full examination was performed including scalp, face, neck, chest, abdomen, back, bilateral upper extremities, bilateral lower extremities. Patient declines exam underneath the underwear; patient declines exam of the lower abdomen/mons pubis, buttocks, groin, genitalia, perineal and perianal skin and these areas were not examined.     All findings within normal limits unless otherwise noted below.    Assessment/Plan   1. Actinic keratosis (2)  Left Forearm - Posterior, Right Forearm - Posterior  Erythematous scaly macule(s)    -Discussed nature of diagnosis and treatment options.   -Patient wishes to proceed with Cryotherapy today  -Possible side effects of liquid nitrogen treatment reviewed including formation of  blisters, crusting, tenderness, scar, and discoloration which may be permanent.  -Patient advised to return the office for re-evaluation if the treated lesion(s) do not resolve within 4-6 weeks. Patient verbalizes understanding.    **AK present on the R lateral eyebrow. Patient declines cryotherapy for this lesion today. Discussed if not resolved in 3-6 months to please reconsider and return for LN2.    Destr of lesion - Left Forearm - Posterior, Right Forearm - Posterior  Complexity: simple    Destruction method: cryotherapy    Informed consent: discussed and consent obtained    Lesion destroyed using liquid nitrogen: Yes    Outcome: patient tolerated procedure well with no complications    Post-procedure details: wound care instructions given      2. Multiple benign nevi  Brown and tan macules and papules with reassuring findings on dermoscopy    -These lesions have benign, reassuring patterns on dermoscopy  -Recommend continued self observation, and to contact the office if any changes in nevi are noticed    3. Lentigo  Tan macules    -Benign appearing on exam  -Reassurance, recommend observation    4. Seborrheic keratosis  Stuck on, waxy macule(s)/papule(s)/plaque(s) with comedo-like openings and milia like cysts    -Discussed the nature of the diagnosis  -Reassurance, recommend continued observation    5. Hemangioma of skin  Cherry red papules    -Discussed the nature of the diagnosis  -Reassurance, recommend continued observation    6. Personal history of skin cancer    Personal History of Non-Melanoma Skin Cancer, BCC on the nose in 2008 with recurrence, treated with Dr Hernandez in 2015  -Well healed scar(s) with no evidence of recurrence  -Discussed the need for annual or semi-annual skin examinations and to return sooner if any new or changing lesions are noticed. Patient verbalizes understanding        Discussed/information given on safe sun practices and use of sunscreen, sun protective clothing or sun  avoidance. Recommend to use over the counter medication of sunscreen with a SPF 30 or higher on a daily basis prior to sun exposure to reduce the risk of skin cancer.    Follow up in 1 year for FSE, sooner for AK LN2 if lesion does not resolve  Discussed if there are any changes or development of concerning symptoms (lesion/skin condition is changing, bleeding, enlarging, or worsening) the patient is to contact my office. The patient verbalizes understanding.     Jinny Rhoades MD  12/11/2023

## 2024-06-04 DIAGNOSIS — E03.9 ACQUIRED HYPOTHYROIDISM: Primary | ICD-10-CM

## 2024-06-04 DIAGNOSIS — E03.9 ACQUIRED HYPOTHYROIDISM: ICD-10-CM

## 2024-06-04 RX ORDER — LEVOTHYROXINE SODIUM 75 UG/1
TABLET ORAL
Qty: 117 TABLET | Refills: 1 | Status: SHIPPED | OUTPATIENT
Start: 2024-06-04

## 2024-06-04 RX ORDER — LEVOTHYROXINE SODIUM 75 UG/1
TABLET ORAL
Qty: 90 TABLET | Refills: 1 | Status: SHIPPED | OUTPATIENT
Start: 2024-06-04 | End: 2024-06-04 | Stop reason: SDUPTHER

## 2024-06-28 ENCOUNTER — APPOINTMENT (OUTPATIENT)
Dept: PRIMARY CARE | Facility: CLINIC | Age: 54
End: 2024-06-28
Payer: COMMERCIAL

## 2024-07-10 ENCOUNTER — APPOINTMENT (OUTPATIENT)
Dept: PRIMARY CARE | Facility: CLINIC | Age: 54
End: 2024-07-10
Payer: COMMERCIAL

## 2024-08-06 DIAGNOSIS — E03.9 ACQUIRED HYPOTHYROIDISM: ICD-10-CM

## 2024-08-06 RX ORDER — LEVOTHYROXINE SODIUM 75 UG/1
TABLET ORAL
Qty: 117 TABLET | Refills: 1 | Status: SHIPPED | OUTPATIENT
Start: 2024-08-06

## 2024-08-06 NOTE — TELEPHONE ENCOUNTER
Pt is aware that she had another refill for the last scripts sent but insurance won't cover [facility] anymore. New script to be sent to [facility] on file.

## 2024-10-28 DIAGNOSIS — E03.9 ACQUIRED HYPOTHYROIDISM: ICD-10-CM

## 2024-10-28 RX ORDER — LEVOTHYROXINE SODIUM 75 UG/1
TABLET ORAL
Qty: 117 TABLET | Refills: 1 | Status: SHIPPED | OUTPATIENT
Start: 2024-10-28

## 2024-11-11 ENCOUNTER — APPOINTMENT (OUTPATIENT)
Dept: DERMATOLOGY | Facility: CLINIC | Age: 54
End: 2024-11-11
Payer: COMMERCIAL

## 2024-11-11 DIAGNOSIS — D22.9 MULTIPLE BENIGN NEVI: ICD-10-CM

## 2024-11-11 DIAGNOSIS — L57.0 ACTINIC KERATOSIS: ICD-10-CM

## 2024-11-11 DIAGNOSIS — D18.01 HEMANGIOMA OF SKIN: ICD-10-CM

## 2024-11-11 DIAGNOSIS — Z85.828 PERSONAL HISTORY OF SKIN CANCER: ICD-10-CM

## 2024-11-11 DIAGNOSIS — L81.4 LENTIGO: ICD-10-CM

## 2024-11-11 DIAGNOSIS — D48.5 NEOPLASM OF UNCERTAIN BEHAVIOR OF SKIN: Primary | ICD-10-CM

## 2024-11-11 DIAGNOSIS — L82.1 SEBORRHEIC KERATOSIS: ICD-10-CM

## 2024-11-11 PROCEDURE — 17000 DESTRUCT PREMALG LESION: CPT | Performed by: DERMATOLOGY

## 2024-11-11 PROCEDURE — 99213 OFFICE O/P EST LOW 20 MIN: CPT | Performed by: DERMATOLOGY

## 2024-11-11 PROCEDURE — 1036F TOBACCO NON-USER: CPT | Performed by: DERMATOLOGY

## 2024-11-11 PROCEDURE — 11310 SHAVE SKIN LESION 0.5 CM/<: CPT | Performed by: DERMATOLOGY

## 2024-11-11 NOTE — PROGRESS NOTES
Curt Gómez is a 54 y.o. female who presents for the following: Skin Check (Personal history of basal cell carcinoma. Personal history of Actinic Keratosis. Chaperone offered and declined.  ).     Review of Systems:  No other skin or systemic complaints other than what is documented elsewhere in the note.    The following portions of the chart were reviewed this encounter and updated as appropriate:  Tobacco  Allergies  Meds  Problems  Med Hx  Surg Hx  Fam Hx                Objective     Well appearing patient in no apparent distress; mood and affect are within normal limits.    A full examination was performed including scalp, face, neck, chest, abdomen, back, bilateral upper extremities, bilateral lower extremities, genitalia.    All findings within normal limits unless otherwise noted below.    Assessment/Plan   1. Neoplasm of uncertain behavior of skin  Left Upper Cutaneous Lip  5mm erythematous papule                  Shave removal    Lesion diameter (cm):  0.5  Informed consent: discussed and consent obtained    Timeout: patient name, date of birth, surgical site, and procedure verified    Procedure prep:  Patient was prepped and draped  Anesthesia: the lesion was anesthetized in a standard fashion    Anesthetic:  1% lidocaine w/ epinephrine 1-100,000 local infiltration  Instrument used: DermaBlade    Hemostasis achieved with: aluminum chloride    Outcome: patient tolerated procedure well    Post-procedure details: sterile dressing applied and wound care instructions given    Dressing type: bandage and petrolatum      Staff Communication: Dermatology Local Anesthesia: Site Location: L upper cutaneous lip 1 % Lidocaine / Epinephrine - Amount: 0.5cc    Specimen 1 - Dermatopathology- DERM LAB  Differential Diagnosis: r/o BCC  Check Margins Yes/No?:    Comments:    Dermpath Lab: Routine Histopathology (formalin-fixed tissue)    2. Actinic keratosis  Right Eyebrow  Erythematous scaly  macule(s)    -Discussed nature of diagnosis and treatment options.   -Patient wishes to proceed with Cryotherapy today  -Possible side effects of liquid nitrogen treatment reviewed including formation of blisters, crusting, tenderness, scar, and discoloration which may be permanent.  -Patient advised to return the office for re-evaluation if the treated lesion(s) do not resolve within 4-6 weeks. Patient verbalizes understanding.    Destr of lesion - Right Eyebrow  Complexity: simple    Destruction method: cryotherapy    Informed consent: discussed and consent obtained    Lesion destroyed using liquid nitrogen: Yes    Outcome: patient tolerated procedure well with no complications    Post-procedure details: wound care instructions given      3. Multiple benign nevi  Brown and tan macules and papules with reassuring findings on dermoscopy    -These lesions have benign, reassuring patterns on dermoscopy  -Recommend continued self observation, and to contact the office if any changes in nevi are noticed    4. Lentigo  Tan macules    -Benign appearing on exam  -Reassurance, recommend observation    5. Seborrheic keratosis  Stuck on, waxy macule(s)/papule(s)/plaque(s) with comedo-like openings and milia like cysts    -Discussed the nature of the diagnosis  -Reassurance, recommend continued observation    6. Hemangioma of skin  Cherry red papules    -Discussed the nature of the diagnosis  -Reassurance, recommend continued observation    7. Personal history of skin cancer    Personal History of Non-Melanoma Skin Cancer, BCC on the nose in 2008 with recurrence, treated with Dr Hernandez in 2015  -Well healed scar(s) with no evidence of recurrence  -Discussed the need for annual or semi-annual skin examinations and to return sooner if any new or changing lesions are noticed. Patient verbalizes understanding        Discussed/information given on safe sun practices and use of sunscreen, sun protective clothing or sun avoidance.  Recommend to use over the counter medication of sunscreen with a SPF 30 or higher on a daily basis prior to sun exposure to reduce the risk of skin cancer.    Follow up in 1 year for FSE  Discussed if there are any changes or development of concerning symptoms (lesion/skin condition is changing, bleeding, enlarging, or worsening) the patient is to contact my office. The patient verbalizes understanding.     Jinny Rhoades MD  11/11/2024

## 2024-11-13 LAB
LABORATORY COMMENT REPORT: NORMAL
PATH REPORT.FINAL DX SPEC: NORMAL
PATH REPORT.GROSS SPEC: NORMAL
PATH REPORT.MICROSCOPIC SPEC OTHER STN: NORMAL
PATH REPORT.RELEVANT HX SPEC: NORMAL
PATH REPORT.TOTAL CANCER: NORMAL

## 2024-11-13 NOTE — RESULT ENCOUNTER NOTE
Reviewed pathology results, indicating a diagnosis of an actinic keratosis, which is a pre cancerous lesion. I recommend the patient return for treatment with liquid nitrogen. Please have PAR schedule the patient for an appointment with me for cryotherapy and let the patient know, thank you.

## 2024-11-14 NOTE — RESULT ENCOUNTER NOTE
Pt was contacted and notified of results via voicemail.  Pt was advised to contact office for scheduling.  Call back numbers left at this time.     Lino Hunter LPN

## 2024-11-15 NOTE — RESULT ENCOUNTER NOTE
Pt contacted and second message left requesting call back for scheduling for treatment of actinic keratosis.  Call back number left.    Lino Hunter LPN

## 2024-11-19 NOTE — RESULT ENCOUNTER NOTE
Pt contacted and voicemail left regarding biopsy results at this time. Pt was advised to contact  to schedule for LN2 treatment.    Lino Hunter LPN

## 2024-11-21 NOTE — RESULT ENCOUNTER NOTE
Pt was contacted via [employer] at this time. Pt has seen pathology results.     Lino Hunter LPN

## 2024-12-02 ENCOUNTER — APPOINTMENT (OUTPATIENT)
Dept: DERMATOLOGY | Facility: CLINIC | Age: 54
End: 2024-12-02
Payer: COMMERCIAL

## 2024-12-02 DIAGNOSIS — L57.0 ACTINIC KERATOSIS: Primary | ICD-10-CM

## 2024-12-02 PROCEDURE — 17000 DESTRUCT PREMALG LESION: CPT | Performed by: DERMATOLOGY

## 2024-12-02 PROCEDURE — 1036F TOBACCO NON-USER: CPT | Performed by: DERMATOLOGY

## 2024-12-02 NOTE — PROGRESS NOTES
Subjective     Helen Gómez is a 54 y.o. female who presents for the following: LN2 (Liquid Nitrogen) (Bx proven Actinic Keratosis 11.11.24 left upper cutaneous lip).     Review of Systems:  No other skin or systemic complaints other than what is documented elsewhere in the note.    The following portions of the chart were reviewed this encounter and updated as appropriate:   Tobacco  Allergies  Meds  Problems  Med Hx  Surg Hx  Fam Hx                Objective   Well appearing patient in no apparent distress; mood and affect are within normal limits.    A focused skin examination was performed. All findings within normal limits unless otherwise noted below.    Assessment/Plan   1. Actinic keratosis  Left Upper Cutaneous Lip  Biopsy proven AK    -Discussed nature of diagnosis and treatment options, biopsy proven AK present on the deep and peripheral margin  -Patient wishes to proceed with Cryotherapy today  -Possible side effects of liquid nitrogen treatment reviewed including formation of blisters, crusting, tenderness, scar, and discoloration which may be permanent.  -Patient advised to return the office for re-evaluation if the treated lesion(s) do not resolve within 4-6 weeks. Patient verbalizes understanding.    Destr of lesion - Left Upper Cutaneous Lip  Complexity: simple    Destruction method: cryotherapy    Informed consent: discussed and consent obtained    Lesion destroyed using liquid nitrogen: Yes    Outcome: patient tolerated procedure well with no complications    Post-procedure details: wound care instructions given          Follow up in November 2025 for FSE  Discussed if there are any changes or development of concerning symptoms (lesion/skin condition is changing, bleeding, enlarging, or worsening) the patient is to contact my office. The patient verbalizes understanding.    Jinny Rhoades MD  12/2/2024

## 2024-12-17 ENCOUNTER — APPOINTMENT (OUTPATIENT)
Dept: PRIMARY CARE | Facility: CLINIC | Age: 54
End: 2024-12-17
Payer: COMMERCIAL

## 2024-12-18 ENCOUNTER — APPOINTMENT (OUTPATIENT)
Dept: DERMATOLOGY | Facility: CLINIC | Age: 54
End: 2024-12-18
Payer: COMMERCIAL

## 2025-01-14 ENCOUNTER — APPOINTMENT (OUTPATIENT)
Dept: PRIMARY CARE | Facility: CLINIC | Age: 55
End: 2025-01-14
Payer: COMMERCIAL

## 2025-01-14 ENCOUNTER — LAB (OUTPATIENT)
Dept: LAB | Facility: LAB | Age: 55
End: 2025-01-14
Payer: MEDICARE

## 2025-01-14 VITALS
TEMPERATURE: 97.5 F | SYSTOLIC BLOOD PRESSURE: 146 MMHG | DIASTOLIC BLOOD PRESSURE: 78 MMHG | HEIGHT: 65 IN | OXYGEN SATURATION: 97 % | WEIGHT: 150.3 LBS | HEART RATE: 51 BPM | BODY MASS INDEX: 25.04 KG/M2 | RESPIRATION RATE: 14 BRPM

## 2025-01-14 DIAGNOSIS — Z11.59 NEED FOR HEPATITIS C SCREENING TEST: ICD-10-CM

## 2025-01-14 DIAGNOSIS — E55.9 VITAMIN D DEFICIENCY: ICD-10-CM

## 2025-01-14 DIAGNOSIS — Z11.4 ENCOUNTER FOR SCREENING FOR HIV: ICD-10-CM

## 2025-01-14 DIAGNOSIS — B00.1 RECURRENT COLD SORES: ICD-10-CM

## 2025-01-14 DIAGNOSIS — E78.2 MIXED HYPERLIPIDEMIA: ICD-10-CM

## 2025-01-14 DIAGNOSIS — Z00.00 WELLNESS EXAMINATION: Primary | ICD-10-CM

## 2025-01-14 DIAGNOSIS — Z00.00 WELLNESS EXAMINATION: ICD-10-CM

## 2025-01-14 DIAGNOSIS — E03.9 HYPOTHYROIDISM, UNSPECIFIED TYPE: ICD-10-CM

## 2025-01-14 DIAGNOSIS — Z12.31 BREAST CANCER SCREENING BY MAMMOGRAM: ICD-10-CM

## 2025-01-14 DIAGNOSIS — E03.9 ACQUIRED HYPOTHYROIDISM: ICD-10-CM

## 2025-01-14 DIAGNOSIS — R03.0 ELEVATED BP WITHOUT DIAGNOSIS OF HYPERTENSION: ICD-10-CM

## 2025-01-14 PROBLEM — E78.5 HYPERLIPIDEMIA: Status: RESOLVED | Noted: 2023-09-18 | Resolved: 2025-01-14

## 2025-01-14 PROBLEM — S01.501D UNSPECIFIED OPEN WOUND OF LIP, SUBSEQUENT ENCOUNTER: Status: RESOLVED | Noted: 2018-06-20 | Resolved: 2025-01-14

## 2025-01-14 LAB
25(OH)D3 SERPL-MCNC: 58 NG/ML (ref 30–100)
ALBUMIN SERPL BCP-MCNC: 4.5 G/DL (ref 3.4–5)
ALP SERPL-CCNC: 90 U/L (ref 33–110)
ALT SERPL W P-5'-P-CCNC: 16 U/L (ref 7–45)
ANION GAP SERPL CALC-SCNC: 13 MMOL/L (ref 10–20)
AST SERPL W P-5'-P-CCNC: 18 U/L (ref 9–39)
BILIRUB SERPL-MCNC: 1 MG/DL (ref 0–1.2)
BUN SERPL-MCNC: 20 MG/DL (ref 6–23)
CALCIUM SERPL-MCNC: 9.9 MG/DL (ref 8.6–10.6)
CHLORIDE SERPL-SCNC: 103 MMOL/L (ref 98–107)
CHOLEST SERPL-MCNC: 252 MG/DL (ref 0–199)
CHOLESTEROL/HDL RATIO: 3.3
CO2 SERPL-SCNC: 31 MMOL/L (ref 21–32)
CREAT SERPL-MCNC: 0.78 MG/DL (ref 0.5–1.05)
EGFRCR SERPLBLD CKD-EPI 2021: 89 ML/MIN/1.73M*2
ERYTHROCYTE [DISTWIDTH] IN BLOOD BY AUTOMATED COUNT: 12.9 % (ref 11.5–14.5)
GLUCOSE SERPL-MCNC: 93 MG/DL (ref 74–99)
HCT VFR BLD AUTO: 45.2 % (ref 36–46)
HCV AB SER QL: NONREACTIVE
HDLC SERPL-MCNC: 77.5 MG/DL
HGB BLD-MCNC: 14.5 G/DL (ref 12–16)
HIV 1+2 AB+HIV1 P24 AG SERPL QL IA: NONREACTIVE
LDLC SERPL CALC-MCNC: 152 MG/DL
MCH RBC QN AUTO: 30.9 PG (ref 26–34)
MCHC RBC AUTO-ENTMCNC: 32.1 G/DL (ref 32–36)
MCV RBC AUTO: 96 FL (ref 80–100)
NON HDL CHOLESTEROL: 175 MG/DL (ref 0–149)
NRBC BLD-RTO: 0 /100 WBCS (ref 0–0)
PLATELET # BLD AUTO: 239 X10*3/UL (ref 150–450)
POTASSIUM SERPL-SCNC: 4.1 MMOL/L (ref 3.5–5.3)
PROT SERPL-MCNC: 7.1 G/DL (ref 6.4–8.2)
RBC # BLD AUTO: 4.69 X10*6/UL (ref 4–5.2)
SODIUM SERPL-SCNC: 143 MMOL/L (ref 136–145)
TRIGL SERPL-MCNC: 111 MG/DL (ref 0–149)
TSH SERPL-ACNC: 0.95 MIU/L (ref 0.44–3.98)
VLDL: 22 MG/DL (ref 0–40)
WBC # BLD AUTO: 4.7 X10*3/UL (ref 4.4–11.3)

## 2025-01-14 PROCEDURE — 82306 VITAMIN D 25 HYDROXY: CPT

## 2025-01-14 PROCEDURE — 84443 ASSAY THYROID STIM HORMONE: CPT

## 2025-01-14 PROCEDURE — 99396 PREV VISIT EST AGE 40-64: CPT | Performed by: FAMILY MEDICINE

## 2025-01-14 PROCEDURE — 85027 COMPLETE CBC AUTOMATED: CPT

## 2025-01-14 PROCEDURE — 1036F TOBACCO NON-USER: CPT | Performed by: FAMILY MEDICINE

## 2025-01-14 PROCEDURE — 80061 LIPID PANEL: CPT

## 2025-01-14 PROCEDURE — 3008F BODY MASS INDEX DOCD: CPT | Performed by: FAMILY MEDICINE

## 2025-01-14 PROCEDURE — 80053 COMPREHEN METABOLIC PANEL: CPT

## 2025-01-14 PROCEDURE — 86803 HEPATITIS C AB TEST: CPT

## 2025-01-14 PROCEDURE — 87389 HIV-1 AG W/HIV-1&-2 AB AG IA: CPT

## 2025-01-14 ASSESSMENT — PATIENT HEALTH QUESTIONNAIRE - PHQ9
2. FEELING DOWN, DEPRESSED OR HOPELESS: NOT AT ALL
SUM OF ALL RESPONSES TO PHQ9 QUESTIONS 1 AND 2: 0
1. LITTLE INTEREST OR PLEASURE IN DOING THINGS: NOT AT ALL

## 2025-01-14 ASSESSMENT — ENCOUNTER SYMPTOMS
CONSTITUTIONAL NEGATIVE: 1
CARDIOVASCULAR NEGATIVE: 1
RESPIRATORY NEGATIVE: 1
MUSCULOSKELETAL NEGATIVE: 1
NEUROLOGICAL NEGATIVE: 1
GASTROINTESTINAL NEGATIVE: 1
PSYCHIATRIC NEGATIVE: 1

## 2025-01-14 NOTE — ASSESSMENT & PLAN NOTE
Lab Results   Component Value Date    TSH 1.41 12/07/2023   Continue Synthroid 75 mcg daily.  Repeat TSH ordered.

## 2025-01-14 NOTE — ASSESSMENT & PLAN NOTE
Lab Results   Component Value Date    CHOL 244 (H) 12/07/2023    CHOL 232 (H) 10/05/2022    CHOL 226 (H) 03/24/2022     Lab Results   Component Value Date    HDL 80.4 12/07/2023    HDL 81.7 10/05/2022    HDL 77.4 03/24/2022     Lab Results   Component Value Date    LDLCALC 147 (H) 12/07/2023     Lab Results   Component Value Date    TRIG 82 12/07/2023    TRIG 74 10/05/2022    TRIG 87 03/24/2022   Stable.  Repeat lipid panel ordered.

## 2025-01-14 NOTE — PROGRESS NOTES
"Subjective   Patient ID: Helen Gómez is a 54 y.o. female who presents for annual physical/wellness visit.    He signed document informing that if problem issues also address at today's wellness visit that insurance may be appropriately billed so co-pay and deductible out of pocket expenses may occur.    Colorectal cancer screen: 4/22/2019  Mammogram: 12/18/2023 - due now  Tdap: 2016  HIV screen: order placed  Hepatitis C screen: order placed  Hepatitis B vaccine: completed 4/27/2007  PAP 2022 w HPV -due 5 yr interval 2027    She is doing well overall. She states that she is the only one in her household that did not get sick last year besides a sinus infection. She has no concerns at this time.   She very rarely gets a cold sore anymore. Acyclovir works well for her and she can feel a sensation when one is starting, this is when she applies the ointment.  She follows routinely with an eye doctor and dentist.          Review of Systems   Constitutional: Negative.    HENT: Negative.     Respiratory: Negative.     Cardiovascular: Negative.    Gastrointestinal: Negative.    Genitourinary: Negative.    Musculoskeletal: Negative.    Neurological: Negative.    Psychiatric/Behavioral: Negative.         Objective   /78 (BP Location: Right arm, Patient Position: Sitting, BP Cuff Size: Adult)   Pulse 51   Temp 36.4 °C (97.5 °F) (Temporal)   Resp 14   Ht 1.651 m (5' 5\")   Wt 68.2 kg (150 lb 4.8 oz)   LMP  (LMP Unknown)   SpO2 97%   BMI 25.01 kg/m²     Physical Exam  Constitutional:       Appearance: Normal appearance. She is normal weight.   HENT:      Head: Normocephalic.      Right Ear: Tympanic membrane normal.      Left Ear: Tympanic membrane normal.      Nose: Nose normal.      Mouth/Throat:      Pharynx: Oropharynx is clear.   Eyes:      Conjunctiva/sclera: Conjunctivae normal.   Cardiovascular:      Rate and Rhythm: Normal rate and regular rhythm.      Pulses: Normal pulses.      Heart sounds: Normal " heart sounds.   Pulmonary:      Effort: Pulmonary effort is normal.      Breath sounds: Normal breath sounds.   Abdominal:      General: Abdomen is flat. Bowel sounds are normal.      Palpations: Abdomen is soft.   Musculoskeletal:      Cervical back: Normal range of motion.   Neurological:      General: No focal deficit present.      Mental Status: She is alert.   Psychiatric:         Mood and Affect: Mood normal.         Behavior: Behavior normal.         Thought Content: Thought content normal.         Judgment: Judgment normal.         Assessment/Plan   Problem List Items Addressed This Visit       Mixed hyperlipidemia     Lab Results   Component Value Date    CHOL 244 (H) 12/07/2023    CHOL 232 (H) 10/05/2022    CHOL 226 (H) 03/24/2022     Lab Results   Component Value Date    HDL 80.4 12/07/2023    HDL 81.7 10/05/2022    HDL 77.4 03/24/2022     Lab Results   Component Value Date    LDLCALC 147 (H) 12/07/2023     Lab Results   Component Value Date    TRIG 82 12/07/2023    TRIG 74 10/05/2022    TRIG 87 03/24/2022   Stable.  Repeat lipid panel ordered.         Acquired hypothyroidism     Lab Results   Component Value Date    TSH 1.41 12/07/2023   Continue Synthroid 75 mcg daily.  Repeat TSH ordered.         Vitamin D deficiency     Lab Results   Component Value Date    VITD25 56 10/05/2022   Continue supplement.         Relevant Orders    Vitamin D 25-Hydroxy,Total (for eval of Vitamin D levels)    Recurrent cold sores     Stable on acyclovir.         Elevated BP without diagnosis of hypertension     Blood pressure in office today:  BP Readings from Last 1 Encounters:   01/14/25 146/78   The goal range for blood pressure is below 130/80.   We will continue to monitor.     Provided educational material on correct way to take at home BP.  Would like pt to check BID for 2 weeks, send results via [employer].         Wellness examination - Primary    Relevant Orders    CBC    Lipid Panel    Comprehensive Metabolic Panel  "    Other Visit Diagnoses       Encounter for screening for HIV        Relevant Orders    HIV 1/2 Antigen/Antibody Screen with Reflex to Confirmation    Need for hepatitis C screening test        Relevant Orders    Hepatitis C Antibody    Hypothyroidism, unspecified type        Relevant Orders    TSH with reflex to Free T4 if abnormal    Breast cancer screening by mammogram        Relevant Orders    BI mammo bilateral screening tomosynthesis          Follow up: Scheduled 1/15/2026    Tips for your general wellness...;  Remember importance of daily aerobic exercise for 30minutes to help with both your physical and mental/emotional health.  ;  Take time twice a day to relax with focused breathing and relaxation.  A good source to learn \"mindfulness\" relaxation is a book \"Mindfulness for Beginners\" by Billy Platt.  ;    Strive for healthy eating with plenty of water, fruits, vegetables, and choosing as much plant based diet as able  If do consume non plant protein, choose fish high in omega (like salmon or cod), skinless poultry, and rarely anything from a cow  Avoid processed foods.  ;  Shop the perimeter of the grocery store  - not the inner aisles where all the boxed, bagged, and canned process non natural foods are   Avoid sugar - including fruit juices with added sugar and avoid artificial sweeteners (sucralose, aspartame).; if want to use sweetener, use stevia (natural plant based non caloric sweetener)  Recommended guided nutrition plans include Mediterranean Diet - online resources available     Get plenty of sleep nightly - 7 hours minimum;    Exercise 150min per week;    Do not use tobacco    Abstain or limit alcohol to 1-2 drinks per 24 hours    See your dentist at least yearly    Have an eye check at least every 5 years    Follow up 1 year for annual wellness checkup;    Scribe Attestation  By signing my name below, INona Scribe   attest that this documentation has been prepared under the " direction and in the presence of Vick Henry MD.

## 2025-01-14 NOTE — ASSESSMENT & PLAN NOTE
Blood pressure in office today:  BP Readings from Last 1 Encounters:   01/14/25 146/78   The goal range for blood pressure is below 130/80.   We will continue to monitor.     Provided educational material on correct way to take at home BP.  Would like pt to check BID for 2 weeks, send results via [employer].

## 2025-01-15 DIAGNOSIS — Z13.6 SCREENING FOR CARDIOVASCULAR CONDITION: Primary | ICD-10-CM

## 2025-01-16 ENCOUNTER — HOSPITAL ENCOUNTER (OUTPATIENT)
Dept: RADIOLOGY | Facility: CLINIC | Age: 55
Discharge: HOME | End: 2025-01-16
Payer: MEDICARE

## 2025-01-16 DIAGNOSIS — Z13.6 SCREENING FOR CARDIOVASCULAR CONDITION: ICD-10-CM

## 2025-01-16 PROCEDURE — 75571 CT HRT W/O DYE W/CA TEST: CPT

## 2025-01-27 DIAGNOSIS — R41.3 MEMORY IMPAIRMENT: Primary | ICD-10-CM

## 2025-01-29 ENCOUNTER — HOSPITAL ENCOUNTER (OUTPATIENT)
Dept: RADIOLOGY | Facility: CLINIC | Age: 55
Discharge: HOME | End: 2025-01-29
Payer: MEDICARE

## 2025-01-29 VITALS — HEIGHT: 65 IN | BODY MASS INDEX: 24.99 KG/M2 | WEIGHT: 150 LBS

## 2025-01-29 DIAGNOSIS — Z12.31 BREAST CANCER SCREENING BY MAMMOGRAM: ICD-10-CM

## 2025-01-29 LAB — VIT B12 SERPL-MCNC: 512 PG/ML (ref 200–1100)

## 2025-01-29 PROCEDURE — 77063 BREAST TOMOSYNTHESIS BI: CPT | Performed by: RADIOLOGY

## 2025-01-29 PROCEDURE — 77067 SCR MAMMO BI INCL CAD: CPT

## 2025-01-29 PROCEDURE — 77067 SCR MAMMO BI INCL CAD: CPT | Performed by: RADIOLOGY

## 2025-02-10 ENCOUNTER — APPOINTMENT (OUTPATIENT)
Dept: RADIOLOGY | Facility: CLINIC | Age: 55
End: 2025-02-10
Payer: MEDICARE

## 2025-02-17 ENCOUNTER — TELEPHONE (OUTPATIENT)
Dept: PRIMARY CARE | Facility: CLINIC | Age: 55
End: 2025-02-17
Payer: MEDICARE

## 2025-02-17 NOTE — TELEPHONE ENCOUNTER
Patient came in and dropped off paper work for BP and Synthroid prescription update. I put in your mailbox

## 2025-02-18 DIAGNOSIS — I10 PRIMARY HYPERTENSION: Primary | ICD-10-CM

## 2025-02-18 DIAGNOSIS — E03.9 ACQUIRED HYPOTHYROIDISM: ICD-10-CM

## 2025-02-18 RX ORDER — LEVOTHYROXINE SODIUM 75 UG/1
TABLET ORAL
Qty: 117 TABLET | Refills: 3 | Status: SHIPPED | OUTPATIENT
Start: 2025-02-18

## 2025-02-18 RX ORDER — VALSARTAN 40 MG/1
40 TABLET ORAL DAILY
Qty: 100 TABLET | Refills: 3 | Status: SHIPPED | OUTPATIENT
Start: 2025-02-18 | End: 2026-03-25

## 2025-06-17 ENCOUNTER — OFFICE VISIT (OUTPATIENT)
Dept: NEUROLOGY | Facility: HOSPITAL | Age: 55
End: 2025-06-17
Payer: MEDICARE

## 2025-06-17 VITALS
WEIGHT: 140.43 LBS | BODY MASS INDEX: 23.4 KG/M2 | SYSTOLIC BLOOD PRESSURE: 142 MMHG | HEART RATE: 56 BPM | RESPIRATION RATE: 18 BRPM | DIASTOLIC BLOOD PRESSURE: 87 MMHG | HEIGHT: 65 IN

## 2025-06-17 DIAGNOSIS — G31.84 MILD COGNITIVE IMPAIRMENT: Primary | ICD-10-CM

## 2025-06-17 DIAGNOSIS — R41.3 MEMORY IMPAIRMENT: ICD-10-CM

## 2025-06-17 PROCEDURE — 99417 PROLNG OP E/M EACH 15 MIN: CPT | Performed by: PSYCHIATRY & NEUROLOGY

## 2025-06-17 PROCEDURE — 3008F BODY MASS INDEX DOCD: CPT | Performed by: PSYCHIATRY & NEUROLOGY

## 2025-06-17 PROCEDURE — 99205 OFFICE O/P NEW HI 60 MIN: CPT | Performed by: PSYCHIATRY & NEUROLOGY

## 2025-06-17 PROCEDURE — 99215 OFFICE O/P EST HI 40 MIN: CPT | Mod: GC | Performed by: PSYCHIATRY & NEUROLOGY

## 2025-06-17 RX ORDER — DONEPEZIL HYDROCHLORIDE 5 MG/1
5 TABLET, FILM COATED ORAL NIGHTLY
COMMUNITY
Start: 2025-04-10

## 2025-06-17 ASSESSMENT — ENCOUNTER SYMPTOMS
DEPRESSION: 0
LOSS OF SENSATION IN FEET: 0
OCCASIONAL FEELINGS OF UNSTEADINESS: 0

## 2025-06-17 ASSESSMENT — PAIN SCALES - GENERAL: PAINLEVEL_OUTOF10: 0-NO PAIN

## 2025-06-17 NOTE — PROGRESS NOTES
[facility] [place]    Provider Impression:    Helen Gómez is a pleasant 54 y.o. year old, RH female presenting for initial evaluation for cognitive impairment. Patient saw Dr. Valentin (Neurology) at [facility] on 5/15/25 who requested a second opinion regarding potential clinical trials and treatments. Patient is currently in a clinical trial for early Alzheimer's. Helen Gómez has a PMHx. Of HLD, acquired hypothyroidism, basal cell carcinoma of skin of nose, and mild sleep apnea. She is accompanied by her friend, Saniya. Her , Rc Gómez is her POA. Patient is aware of when the memory problems first started, 8 months ago, after residential. Onset is insidious with gradual progression. Patient has trouble with names of people, forgetting conversations, forgets scheduled events but uses a calendar, and endorses repeating self.     Neurological examination is largely non-focal.  She scored a 21/30 on MMSE 04/10/25. Patient states she has gotten an MRI of brain through a research study but we unable to view it at the time of the visit. Patient has a PET CT scheduled for June 24 th through her research trial. TSH and Vit. B12 were checked recently and came back normal.     Given the history and today's evaluation, I suspect mild cognitive impairment. Other differential diagnosis include early onset Alzheimer's disease, small vessel disease, FTD (less likely). and a multifactorial etiology cannot be ruled out entirely.     Today we discussed your interests in research and potential treatments for cognitive impairment. Patient is has an Amyloid PET scan scheduled and will call us back with the results. We have ordered APOE genotype blood work. Once we have both results, we will schedule a follow up to determine our next action in our plan of care. Continue taking 10mg Donepezil, nightly.     Assessment/Plan   1. Mild cognitive impairment    2. Memory impairment        PLAN:   - Continue 10 mg Donepezil  "(Aricept)  - Get APOE genotype blood work.  - We will wait to hear from you regarding amyloid PET testing from the trial. Call us or send message via [employer] if you need us to place the Amyloid PET/CSF-LP orders.   - We will mail the CD of your brain imaging back to you once we upload it to your chart.   - Once we have confirmation of Alzheimer's via Amyloid PET and APOE results, please call our Nurse, Leslye to schedule a follow up.    HISTORY OF PRESENTING ILLNESS:    Referred by: Vick Henry MD  PCP: MD Ryland Rodriguesronit Gómez. was born and raised in [place], [place], with 12 years of formal education. Moved to [place], [place] in 2000. Patient has a PMHx of  HLD, acquired hypothyroidism, basal cell carcinoma of skin of nose, and mild sleep apnea. Rc,  is healthcare POA but Helen's friend, Saniya is accompanying Mrs. Gómez at today's visit. Patient saw Dr. Valentin (Neurology) at [facility] on 5/15/25 who requested a second opinion regarding potential clinical trials and treatments.  Patient is aware of when the memory problems first started, 8 months. Onset is insidious with gradual progression. Patient has trouble with names of people, conversations, forgets scheduled events but uses a calendar, and endorses repeating self. Mood is \"good.\" Patient has not noticed any significant depressive symptoms, denies excessive worrying, anxiety, or getting frustrated with memory problems. Denies fluctuating cognition, tremors, or any functional impairment. Lives with her , Rc, in a 2-story, house. No changes in her independence of ADLs or iADLs.     Pertinent Past Medical History  Family History[1]   Additional Family History:                                                                                Psychiatric: none       Dementia: none       Parkinson's disease: none       Migraines: none       Perry's disease: none       ALS: none       Other neurologic dx: none    Problem List[2] "   Medical History[3]  Surgical History[4]    Allergies: Reviewed as listed    Current Outpatient Medications   Medication Instructions    acyclovir (Zovirax) 5 % ointment 1 Application, Topical, 5 times daily, Apply to affected area 5 times daily for 4 days.    ascorbic acid, vitamin C, 500 mg capsule Take by mouth.    cholecalciferol (Vitamin D-3) 50 MCG (2000 UT) tablet Take by mouth.    donepezil (ARICEPT) 5 mg, Nightly    ferrous sulfate 325 (65 Fe) MG tablet 65 mg of elemental iron, Daily    omega 3-dha-epa-fish oil 1,000 mg (120 mg-180 mg) capsule Take by mouth.    Synthroid 75 mcg tablet TAKE 1 TABLET BY MOUTH ONCE DAILY; TAKE 1 EXTRA TABLET ON TWO DAYS PER WEEK AS DIRECTED BY PHYSICIAN    valsartan (DIOVAN) 40 mg, oral, Daily    -Takes Donepezil 10 mg at bedtime  - Takes acyclovir PRN when there is an outbreak  Social History:        ? yes. Has 0 children        Worked as a  and retired in February 2023 due to wanting to travel with .        Living arrangement: house, 2 story house with a basement, with 5 stairs       Risk Factors:             ETOH: denies, glass of wine once a month            Drug use: denies            Smoking: denies            Diet: well rounded            Exercise: hiking, daily stretches, and horse riding            Sleep: Goes to sleep at 2200PM and wakes up at 0600AM. Denies having trouble going to sleep and/or staying asleep. Denies waking up on the floor. Does not use cpap, not recommended to use one. Does feel refreshed.             Mood/mental health: good            Stress: Denies            Falls: Roughly 6-7 months ago, she tripped and fell going down, 1 step, into the garage. Denies LOC, denies hitting head.       Social Support: family, friends, and community connections          Social Cognition:       Personality changes: Denies       Socially inappropriate behaviors: Denies       Apathy: Intact; still finds abner/pleasure in hobbies, denies having  indiference towards things going on around them/people       Loss of empathy: Denies       Change in eating habits/appetite/smell/taste: Denies; dropped 20 lbs since group home       Perseverative behaviors: Denies    Cognitive Symptoms:       Memory: impaired; forgetting recent information, important dates/events, and/or asking repetitive questions       Executive functioning: Has difficulty completing tasks       Language: intact       Attention/Concentration: impaired; patient states she sometimes zones out.     ADLS   Basic ADLs  Ambulating  Feeding  Dressing  Personal Hygiene  Toileting Independent Assistance needed Dependent    [x] [] []    [x] [] []    [x] [] []    [x] [] []    [x] [] []   Instrumental ADLs  Driving   Shopping   Finances  Cooking  Cleaning  Medications Independent Assistance needed Dependent    [x] [] []    [x] [] []    [] [] [x]    [x] [] []    [x] [] []    [x] [] []     - Got confused where Saniya's house was when driving-in March 2025.  has always done finances.    Functional Changes:  ADLs:        Continence (urine/bowel): Denies incontinence and constipation.         Medications - Takes own medication; does use pillbox.        Weapons at home: no       Knows 911? Yes     Past Neuropsychiatric History:        Handedness: Right        History of traumatic brain injury: None.        History of seizures: None       History of stroke: None.        Past psychiatric history: None    Neuropsychiatric symptoms:       Neuropsychological testing: no        Mood: good       PDW/SI: Denies. No suicidal thoughts, intent or plans.        Anhedonia: Denies       Self-attitude: positive       Hallucinations: Denies       Delusions: Denies       Depression - denies depression. Appetite ok. Sleeping fine. No worthlessness/helplessness.        Anxiety - Denies.        Psychosis - Denies.        Chelsie - Denies.        OBJECTIVE    Vitals:    06/17/25 1008   BP: 142/87   BP Location: Left arm   Patient  "Position: Sitting   BP Cuff Size: Adult   Pulse: 56   Resp: 18   Weight: 63.7 kg (140 lb 6.9 oz)   Height: 1.651 m (5' 5\")     Extensive review of notes in EMR, labs, tests, Interpretation of neuroimaging     LABS:   Lab Results   Component Value Date    CHOL 252 (H) 01/14/2025    TRIG 111 01/14/2025    HDL 77.5 01/14/2025    CHHDL 3.3 01/14/2025    LDLF 136 (H) 10/05/2022    VLDL 22 01/14/2025    NHDL 175 (H) 01/14/2025   No results found for: \"BNP\", \"HGBA1C\"  Lab Results   Component Value Date    GLUCOSE 93 01/14/2025     01/14/2025    K 4.1 01/14/2025     01/14/2025    CO2 31 01/14/2025    ANIONGAP 13 01/14/2025    BUN 20 01/14/2025    CREATININE 0.78 01/14/2025    CALCIUM 9.9 01/14/2025    ALBUMIN 4.5 01/14/2025     Lab Results   Component Value Date    CALCIUM 9.9 01/14/2025    PROT 7.1 01/14/2025    ALBUMIN 4.5 01/14/2025    AST 18 01/14/2025    ALT 16 01/14/2025    ALKPHOS 90 01/14/2025    BILITOT 1.0 01/14/2025     Lab Results   Component Value Date    WBC 4.7 01/14/2025    HGB 14.5 01/14/2025    HCT 45.2 01/14/2025    MCV 96 01/14/2025     01/14/2025   No results found for: \"INR\"  Lab Results   Component Value Date    TSH 0.95 01/14/2025     Lab Results   Component Value Date    [id number] 512 01/28/2025     APoE testing done on 4/15 but not able to see results. New order put in.     MMSE: 21/30 on 04/10/25    3-step Luria task: completed. Had patient do another 3-step task but made 1 mistake when asked to grab paper with R. Hand, fold in half, and give back with L. Hand.   applause sign: negative  Head turning sign: negative     Physical Exam    REVIEW OF SYSTEMS:  10 point review of systems performed and is negative except as noted in the HPI.     Mental Status Examination:    General Appearance: well groomed, good eye contact, cooperative  Orientation: alert and oriented to time, place, and person    Motor: unremarkable, gait is stable during assessment. No abnormal or adventitious " movements were noted. Hand dexterity is normal.    Speech: normal rate, tone, and rhythm. clear.    Mood:  Denies impulsivity, anxiety, fear, paranoia, irritibaility  Affect:  interactive and cooperative with an appropriate affect.  Passive death wish:  Denies  Suicidal ideation:  Denies   Thought process: logical, linear, and goal-orientated   Thought content: Hallucinations: denies, Delusions: denies  Insight/Judgment Patient does have insight into memory impairment.  Fund of Knowledge: mild impairment for current events and past history.   Recent and remote memory: mildly impaired  Attention span and concentration: intact attention  Language: Regular, rate, rhythm, tone, and volume and fluent. Language intact for comprehension, expression, and vocabulary.       Cranial Nerves:    - II/III: PERRL     - II:  Visual fields intact to confrontation bilaterally      - III, IV, VI: Pupils round, 3mm in diameter, equally reactive to light. No RAPD. No ptosis. EOM full to pursuit without nystagmus.      - V: V1-V3 sensation intact bilaterally. Normal corneal reflexes.     - VII: Face muscles symmetric with smile and eye closure. Nasolabial folds symmetric.      - VIII: Intact to interview. Hearing intact to finger rub, whisper.      - IX, X: Palate elevated symmetrically bilaterally, no hoarseness, normal gag reflex.     - XI: 5/5 strength on shoulder shrugging bilaterally and neck turning    - XII: Tongue midline without atrophy or fasciculation       MOTOR: Tone and bulk normal in all extremities. No tremor, no abnormal movements.     Strength: R/L  Shoulder adduction: 5/5  Shoulder abduction:5/5  Elbow extension:5/5  Elbow flexion:5/5  :5/5  Hip flexion:5/5  Hip extension:5/5  Knee flexion:5/5  Knee extension:5/5  Dorsiflex:5/5  Plantar flex: 5/5    REFLEXES Right Left   Biceps [] 0 []1 [x] 2  []+  []- [] 0 []1 [x] 2  []+  []-   Brachioradialis [] 0 []1 [x] 2  []+  []- [] 0 []1 [x] 2  []+  []-   Triceps [] 0 []1  [x] 2  []+  []- [] 0 []1 [x] 2  []+  []-   Patellar [] 0 []1 [x] 2  []+  []- [] 0 []1 [x] 2  []+  []-   Ankle [] 0 []1 [x] 2  []+  []- [] 0 []1 [x] 2  []+  []-   Babinski   []positive [x] Negative           SENSORY: Sensory exam was intact to light touch, sharp/dull, vibration and position sense in both UE and LE.     COORDINATION: intact on finger to nose bilaterally. Coordination testing shows no limb dystaxia.     ROMBERG: Negative. Stable with normal base    GAIT:  Normal standard gait with normal arm swing and speed, without spasticity or bradykinesia, and able to tandem walk. No ataxia, shuffling, steppage or waddling was noted. Postural reflexes were normal.     Finger taps and hand opening: normal  Toe and heel taps: normal  Muscular tone: normal   Movements: normal    Time spent 115 min on the day of service, which included preparing to see the patient, face-to-face patient care, completing clinical documentation, obtaining and/or reviewing separately obtained history, performing a medically appropriate examination, counseling and educating the patient/family/caregiver, and ordering medications, tests, or procedures.     No orders of the defined types were placed in this encounter.             [1]   Family History  Problem Relation Name Age of Onset    Skin cancer Mother      Heart attack Father      Other (triple bypass) Father      Cervical cancer Maternal Grandmother      Stroke Paternal Grandmother      Heart attack Paternal Grandfather     [2]   Patient Active Problem List  Diagnosis    Mixed hyperlipidemia    Acquired hypothyroidism    Vitamin D deficiency    Hyperhidrosis    Recurrent cold sores    Actinic keratosis    Other seborrheic keratosis    Skin changes due to chronic exposure to nonionizing radiation, unspecified    Allergic rhinitis    Basal cell carcinoma of skin of nose    Dense breasts    Intramural and submucous leiomyoma of uterus    Melanocytic nevi of trunk    Melanocytic nevi of  unspecified lower limb, including hip    Melanocytic nevi of unspecified upper limb, including shoulder    Melanocytic nevi of other parts of face    Menopausal symptoms    Mild sleep apnea    Neoplasm of uncertain behavior of skin    Nevus, non-neoplastic    Personal history of other malignant neoplasm of skin    Scar condition and fibrosis of skin    Snoring    Elevated BP without diagnosis of hypertension    Wellness examination   [3]   Past Medical History:  Diagnosis Date    Contact with and (suspected) exposure to covid-19 03/15/2021    Suspected COVID-19 virus infection    Cough, unspecified 03/30/2016    Cough    COVID-19 01/28/2022    COVID-19 virus infection    Decreased white blood cell count, unspecified 09/15/2015    Leukopenia    Encounter for health counseling related to travel 02/04/2019    Travel advice encounter    Encounter for immunization 08/21/2018    Immunization due    Encounter for screening for malignant neoplasm of colon 06/29/2018    Screening for colorectal cancer    Encounter for screening for malignant neoplasm of skin 09/11/2015    Skin cancer screening    Encounter for screening for other disorder 08/17/2020    Special screening for other conditions    Impacted cerumen, left ear 08/08/2018    Impacted cerumen of left ear    Laceration without foreign body of unspecified ear, initial encounter 09/09/2021    Torn earlobe    Nausea 03/15/2021    Nausea in adult    Otalgia, right ear 12/29/2020    Right ear pain    Other conditions influencing health status 01/28/2022    History of cough    Other specified personal risk factors, not elsewhere classified 09/27/2018    Cardiovascular risk factor    Personal history of other diseases of the musculoskeletal system and connective tissue 03/15/2021    History of muscle pain    Personal history of other diseases of the musculoskeletal system and connective tissue 02/10/2020    History of myositis    Personal history of other diseases of the  respiratory system 12/29/2020    History of nasal discharge    Personal history of other diseases of the respiratory system 12/29/2020    History of sore throat    Personal history of other diseases of the respiratory system 10/26/2015    History of sinusitis    Personal history of other infectious and parasitic diseases 04/01/2016    History of herpes labialis    Personal history of other malignant neoplasm of skin 08/26/2016    History of basal cell carcinoma    Personal history of other malignant neoplasm of skin 03/30/2016    History of squamous cell carcinoma of skin    Personal history of other specified conditions 08/08/2018    History of dizziness    Personal history of other specified conditions 02/04/2019    History of fatigue    Personal history of other specified conditions 08/26/2016    History of snoring    Personal history of other specified conditions 03/15/2021    History of headache    Personal history of other specified conditions 06/25/2021    History of abnormal mammogram    Personal history of other specified conditions 04/06/2017    History of motion sickness    Personal history of other specified conditions 03/30/2016    History of motion sickness    Personal history of other specified conditions 03/24/2022    History of motion sickness    Personal history of other specified conditions 12/07/2019    History of motion sickness    Sleep apnea, unspecified 02/17/2021    Sleep disorder breathing    Strain of other muscles, fascia and tendons at shoulder and upper arm level, left arm, initial encounter 08/18/2021    Strain of left trapezius muscle   [4] History reviewed. No pertinent surgical history.

## 2025-06-17 NOTE — PATIENT INSTRUCTIONS
You were seen today by Dayana Mari CNP and Dr. Vázquez.  It is a pleasure seeing you.     Given the history and today's evaluation, I suspect mild cognitive impairment     PLAN:  - Continue 10 mg Donepezil (Aricept)  - Get APOE genotype blood work.  - We will wait to hear from you regarding amyloid PET testing from the trial. Call us or send message via [employer] if you need us to place the Amyloid PET/CSF-LP orders.   - We will mail the CD of your brain imaging back to you once we upload it to your chart.   - Once we have confirmation of Alzheimer's via Amyloid PET and APOE results, please call our Nurse, Leslye to schedule a follow up.    Please call our office if you have any questions or concerns.    Sleep Hygiene Recommendations  1. Avoid caffeine within 4-6 hours of bedtime  2. Avoid the use of nicotine close to bedtime or during the night  3. Do not drink alcoholic beverages within 4-6 hours of bedtime  4. While a light snack can promote sound sleep, avoid large meals 2-3 hours before bedtime  5. Minimize light, noise, and extreme temperature in the bedroom.  6.     Reduce stress. Do something relaxing in the evening before bedtime.  7.     Try drinking less water in the evening to avoid waking up to go to the bathroom during the night.   8.     Get regular exercise for 30-60 min at least 3 times per week, but do it at least 4-6 hours before bedtime.     Stimulus Control Recommendations:   9. Lie down at night - only when sleepy  10. Use the bed only for actual sleep (or sex) - not reading, watching TV, etc.  11.   Avoid disturbing content in books or media immediately prior to sleep. Plan 30 min of pleasant or relaxing activities prior to going to bed.  12.   The computer can act as a stimulant. Discontinue working or playing on the computer one hour prior to desired sleep onset.  13. Get out of bed if you wake up at night & cannot fall back asleep, return to bed when sleepy; do not remain in bed awake for  longer than 10 - 15 minutes  14. Avoid napping during the day  15.    Adhere to a strict morning rising time, regardless of how much sleep you got the night before.  16.    [employer] or [employer] are online programs for cognitive behavioral therapy which may help.    General brain health guidelines:    - make sure your other medical conditions are well controlled (e.g., high blood pressure, high cholesterol, diabetes, sleep apnea, etc)  - do not smoke or chew tobacco  - address any sensory deficits (e.g., proper glasses for poor eyesight, hearing aids for hearing loss)  - use a weekly pill box  - address any sensory deficits (e.g., proper glasses for poor eyesight, hearing aids for hearing loss)  - exercise at least four days per week, 30 minutes at a time at an intensity that would make it difficult to converse with someone   - make sure you are getting at least 7 hours of quality sleep per night  - keep yourself mentally active daily by reading, playing cards, doing word searches, puzzles, etc.  - stay socially active by being part of a group or organization  - Try to follow a brain healthy diet (Mediterranean diet) including green leafy vegetables, antioxidant rich foods (such as berries), fish and olive oil. Avoid red meat, fried foods, and processed sugars. Limit alcohol intake. Eat lots of fruits and vegetables, low fat and cholesterol-- UNLESS you are prescribed a specific diet for your medical conditions  - Keep a notepad handy to write things down and make lists, use frequently as a reference.  - If feeling withdrawn or depressed, seek help.    If you have cognitive difficulties, things you can do to minimize the impact in your life and compensate include:     - Limit the use of any medications that can impair brain attention and memory, including pain medications and sedatives.     - Limit distractions when you are trying to concentrate and focus on one task at a time.    - Save the complicated tasks for the  time of day that you are most fresh and alert and take breaks as needed.   - Have a routine in your day to bolster your memory and make sure you have a system to consistently put important items like keys/ phone in the same place.   - Use calendars, notes, alarms and sticky notes to keep you on track.   - Maintain a schedule of activities during the day to stay physically, socially, and mentally active.

## 2025-06-26 DIAGNOSIS — G31.84 MILD COGNITIVE IMPAIRMENT: Primary | ICD-10-CM

## 2025-06-30 LAB
APOE ALLELE E2+E3+E4 BLD/T: NORMAL
REF LAB TEST METHOD: NORMAL
REF LAB TEST RESULTS: NORMAL
SERVICE CMNT 04-IMP: NORMAL
SERVICE CMNT-IMP: NORMAL

## 2025-07-08 ENCOUNTER — HOSPITAL ENCOUNTER (OUTPATIENT)
Dept: RADIOLOGY | Facility: EXTERNAL LOCATION | Age: 55
Discharge: HOME | End: 2025-07-08

## 2025-07-15 ENCOUNTER — TELEPHONE (OUTPATIENT)
Dept: NEUROLOGY | Facility: HOSPITAL | Age: 55
End: 2025-07-15
Payer: MEDICARE

## 2025-07-15 NOTE — TELEPHONE ENCOUNTER
Spoke with Helen and Rc regarding positive Amyloid PET and APOE E3/E3 results and what that means in relation to the ATT. At this time, they would like to schedule an appointment to discuss the ATT in further detail and make their informed decision on whether to pursue the therapy or only participate in the clinical trial at [facility].

## 2025-11-17 ENCOUNTER — APPOINTMENT (OUTPATIENT)
Dept: DERMATOLOGY | Facility: CLINIC | Age: 55
End: 2025-11-17
Payer: COMMERCIAL

## 2026-01-15 ENCOUNTER — APPOINTMENT (OUTPATIENT)
Dept: PRIMARY CARE | Facility: CLINIC | Age: 56
End: 2026-01-15
Payer: MEDICARE